# Patient Record
Sex: FEMALE | Race: WHITE | Employment: FULL TIME | URBAN - METROPOLITAN AREA
[De-identification: names, ages, dates, MRNs, and addresses within clinical notes are randomized per-mention and may not be internally consistent; named-entity substitution may affect disease eponyms.]

---

## 2019-06-26 ENCOUNTER — OFFICE VISIT (OUTPATIENT)
Dept: RHEUMATOLOGY | Age: 59
End: 2019-06-26
Payer: COMMERCIAL

## 2019-06-26 VITALS
SYSTOLIC BLOOD PRESSURE: 124 MMHG | BODY MASS INDEX: 36.88 KG/M2 | DIASTOLIC BLOOD PRESSURE: 86 MMHG | WEIGHT: 216 LBS | HEIGHT: 64 IN

## 2019-06-26 DIAGNOSIS — M11.861: ICD-10-CM

## 2019-06-26 DIAGNOSIS — M15.9 GENERALIZED OSTEOARTHRITIS: Primary | ICD-10-CM

## 2019-06-26 PROCEDURE — G8417 CALC BMI ABV UP PARAM F/U: HCPCS | Performed by: INTERNAL MEDICINE

## 2019-06-26 PROCEDURE — 3017F COLORECTAL CA SCREEN DOC REV: CPT | Performed by: INTERNAL MEDICINE

## 2019-06-26 PROCEDURE — G8427 DOCREV CUR MEDS BY ELIG CLIN: HCPCS | Performed by: INTERNAL MEDICINE

## 2019-06-26 PROCEDURE — 1036F TOBACCO NON-USER: CPT | Performed by: INTERNAL MEDICINE

## 2019-06-26 PROCEDURE — 99204 OFFICE O/P NEW MOD 45 MIN: CPT | Performed by: INTERNAL MEDICINE

## 2019-06-26 RX ORDER — VENLAFAXINE HYDROCHLORIDE 37.5 MG/1
1 CAPSULE, EXTENDED RELEASE ORAL DAILY
Refills: 3 | Status: ON HOLD | COMMUNITY
Start: 2019-06-23 | End: 2022-04-24

## 2019-06-26 RX ORDER — LEVOTHYROXINE SODIUM 0.12 MG/1
1 TABLET ORAL DAILY
Refills: 0 | COMMUNITY
Start: 2019-06-23

## 2019-06-26 RX ORDER — VENLAFAXINE HYDROCHLORIDE 75 MG/1
75 CAPSULE, EXTENDED RELEASE ORAL DAILY
Status: ON HOLD | COMMUNITY
Start: 2019-03-27 | End: 2022-04-24

## 2019-06-26 RX ORDER — LISINOPRIL 40 MG/1
1 TABLET ORAL DAILY
Refills: 2 | COMMUNITY
Start: 2019-05-14

## 2019-06-26 RX ORDER — HYDROCHLOROTHIAZIDE 25 MG/1
1 TABLET ORAL DAILY
Refills: 1 | COMMUNITY
Start: 2019-05-21

## 2019-06-26 RX ORDER — TRAZODONE HYDROCHLORIDE 50 MG/1
1 TABLET ORAL DAILY
Refills: 0 | COMMUNITY
Start: 2019-06-14

## 2019-06-26 NOTE — PATIENT INSTRUCTIONS
OSTEOARTHRITIS:                Osteoarthritis is a joint disease that most often affects middle-age to elderly people. It is commonly referred to as OA or as \"wear and tear\" of the joints, but we now know that OA is a disease of the entire joint, involving the cartilage, joint lining, ligaments, and bone. Although it is more common in older people, it is not really accurate to say that the joints are just \"wearing out. \"  About 27 million Americans are living with OA, the most common form of joint disease. The lifetime risk of developing OA of the knee is about 46%, and the lifetime risk of developing OA of the hip is 25%, according to the [facility], a long-term study from the [employer] and sponsored by [employer] for [employer] and [employer] original products (often called the [employer]) and the [employer]. OA is a top cause of disability in older people. The goal of treatment in OA is to reduce pain and improve function. There is no cure for the disease, but some treatments attempt to slow disease progression. Fast facts  OA is the most common form of joint disease, and is a leading cause of disability in elderly people. This arthritis tends to occur in the hand joints, spine, hips, knees, and great toes. It is characterized by breakdown of the cartilage (the tissue that cushions the ends of the bones between joints), bony changes of the joints, deterioration of tendons and ligaments, and various degrees of inflammation of the synovium (joint lining). Though some of the joint changes are irreversible, most patients will not need joint replacement surgery. OA symptoms (what you feel) can vary greatly among patients. A rheumatologist can detect arthritis and prescribe the proper treatment. In osteoarthritis, the cartilage between the bones in the joint breaks down (left image).  Slowly, affected bones get bigger, as in the hand at right.  What is osteoarthritis? OA is a frequently slowly progressive joint disease typically seen in middle-aged to elderly people. The disease occurs when the joint cartilage breaks down often because of mechanical stress or biochemical alterations, causing the bone underneath to fail. OA can occur together with other types of arthritis, such as gout or rheumatoid arthritis. OA tends to affect commonly used joints such as the hands and spine, and the weight-bearing joints such as the hips and knees. Symptoms include:   Joint pain and stiffness   Knobby swelling at the joint   Cracking or grinding noise with joint movement   Decreased function of the joint  Who gets osteoarthritis? OA affects people of all races and both sexes. Most often, it occurs in  patients age 36 and above. However, it can occur sooner if you have  other risk factors (things that raise the risk of getting OA). Risk factors include:   Older age   Having family members with OA   Obesity   Joint injury or repetitive use (overuse) of joints   Joint deformity such as unequal leg length, bowlegs or knocked knees  How is osteoarthritis diagnosed? Most often doctors detect OA based on the typical symptoms (described earlier) and on results of the physical exam. In some cases, X-rays or other imaging tests may be useful to tell the extent of disease or to help rule out other joint problems. Circles indicate joints that osteoarthritis most often affects. How is osteoarthritis treated? There is no proven treatment yet that can reverse joint damage from OA. The goal of treatment is to reduce pain and improve function of the affected joints. Most often, this is possible with a mixture of physical measures and drug therapy and, sometimes, surgery. Physical measures - Weight loss and exercise are useful in OA. Excess weight puts stress on your knee joints and hips and low back.  For every 10 pounds of weight you lose over 10 years, you can reduce the chance of developing knee OA by up to 50%. Exercise can improve your muscle strength, decrease joint pain and stiffness, and lower the chance of disability due to OA. Also helpful are support (\"assistive\") devices, such as braces or a walking cane, that help you do daily activities. Heat or cold therapy can help relieve OA symptoms for a short time. Certain alternative treatments such as spa (hot tub), massage, acupuncture and chiropractic manipulation can help relieve pain for a short time. They can be costly, though, and require repeated treatments. Also, the long-term benefits of these alternative (sometimes called complementary or integrative) medicine treatments are unproven but are under study. Drug Therapy - Forms of drug therapy include topical, oral (by mouth) and injections (shots). You apply topical drugs directly on the skin over the affected joints. These medicines include capsaicin cream, lidocaine and diclofenac gel. Oral pain relievers such as acetaminophen are common first treatments. So are nonsteroidal anti-inflammatory drugs (often called NSAIDs), which decrease swelling and pain. In 2010, the government (FDA) approved the use of duloxetine (Cymbalta) for chronic (long-term) musculoskeletal pain including from OA. This oral drug is not new. It also is in use for other health concerns, such as mood disorders, nerve pain and fibromyalgia. Patients with more serious pain may need stronger medications, such as prescription narcotics. Joint injections with corticosteroids (sometimes called cortisone shots) or with a form of lubricant called hyaluronic acid can give months of pain relief from OA. This lubricant is given in the knee, and these shots may help delay the need for a knee replacement by a few years in some patients. Surgery - Surgical treatment becomes an option for severe cases.  This includes when the joint has serious damage, or when medical treatment fails to relieve pain and you have major loss of function. Surgery may involve arthroscopy, repair of the joint done through small incisions (cuts). If the joint damage cannot be repaired, you may need a joint replacement. Supplements - Many over-the-counter nutrition supplements have been used for treatment of OA. Most lack good research data to support their effectiveness and safety. Among the most widely used are glucosamine/chondroitin sulfate, calcium and vitamin D, and omega-3 fatty acids. To ensure safety and avoid drug interactions, consult your doctor or pharmacist before using any of these supplements. This is especially true when you are combining these supplements with prescribed drugs. Living with Osteoarthritis  There is no cure for OA, but you can manage how it affects your lifestyle. Some tips include:  Properly position and support your neck and back while sitting or sleeping. Adjust furniture, such as raising a chair or toilet seat. Avoid repeated motions of the joint, especially frequent bending. Lose weight if you are overweight or obese, which can reduce pain and slow progression of OA. Exercise each day. Use arthritis support devices that will help you do daily activities. You might want to work with a physical therapist or occupational therapist to learn the best exercises and to choose arthritis assistive devices. Points to remember  OA is the most common form of arthritis and can occur together with other types of arthritis. The goal of treatment in OA is to reduce pain and improve function. Exercise is an important part of OA treatment because it can decrease joint pain and improve function. At present, there is no treatment that can reverse the damage of OA in the joints. Researchers are trying to find ways to slow or reverse this joint damage.    The rheumatologist's role in the treatment of osteoarthritis  Rheumatologists are doctors who are experts in diagnosing and treating arthritis and other diseases of the joints, muscles and bones. You may also need to see other health care providers, for instance, physical or occupational therapists and orthopedic doctors. To find a rheumatologist  For a list of rheumatologists in your area, click here. Learn more about rheumatologists and rheumatology health professionals. For more information  The [employer] of Rheumatology has compiled this list to give you a starting point for your own additional research. The ACR does not endorse or maintain these Web sites, and is not responsible for any information or claims provided on them. It is always best to talk with your rheumatologist for more information and before making any decisions about your care. Arthritis Foundation  ww.arthritis. [facility] of Arthritis and Musculoskeletal and Skin Diseases   www.[facility]. nih.gov  Rheumatology [address] [place] [address] advances research and training to improve the health of people with rheumatic diseases. © 2012 American College of Rheumatology      Patient Education        Thumb Arthritis: Exercises  Your Care Instructions  Here are some examples of exercises for thumb arthritis. Start each exercise slowly. Ease off the exercise if you start to have pain. Your doctor or your physical or occupational therapist will tell you when you can start these exercises and which ones will work best for you. How to do the exercises  Thumb IP flexion    1. Place your forearm and hand on a table with your affected thumb pointing up. 2. With your other hand, hold your thumb steady just below the joint nearest your thumbnail. 3. Bend the tip of your thumb downward, then straighten it. 4. Repeat 8 to 12 times. 5. Switch hands and repeat steps 1 through 4, even if only one thumb is sore. Thumb MP flexion    1.  Place your forearm and hand on a table with your affected thumb pointing up.  2. With your other hand, hold the base of your thumb and palm steady. 3. Bend your thumb downward where it meets your palm, then straighten it. 4. Repeat 8 to 12 times. 5. Switch hands and repeat steps 1 through 4, even if only one thumb is sore. Thumb opposition    1. With your affected hand, point your fingers and thumb straight up. Your wrist should be relaxed, following the line of your fingers and thumb. 2. Touch your affected thumb to each finger, one finger at a time. This will look like an \"okay\" sign, but try to keep your other fingers straight and pointing upward as much as you can. 3. Repeat 8 to 12 times. 4. Switch hands and repeat steps 1 through 3, even if only one thumb is sore. Follow-up care is a key part of your treatment and safety. Be sure to make and go to all appointments, and call your doctor if you are having problems. It's also a good idea to know your test results and keep a list of the medicines you take. Where can you learn more? Go to https://[employer]. org and sign in to your [employer] account. Enter V811 in the [employer] box to learn more about \"Thumb Arthritis: Exercises. \"     If you do not have an account, please click on the \"Sign Up Now\" link. Current as of: September 20, 2018  Content Version: 12.0  © 9286-4590 Healthwise, Incorporated. Care instructions adapted under license by [facility] ([facility]). If you have questions about a medical condition or this instruction, always ask your healthcare professional. [address] any warranty or liability for your use of this information.

## 2019-06-26 NOTE — PROGRESS NOTES
[address], [place]                                                           [address], [address] [facility]                                                             [phone] (x) [phone] (X)      Dear Dr. Roth primary care provider on file.:  Please find Rheumatology assessment. Thank you for giving me the opportunity to be involved in [facility] care and I look forward following Bennie Hendrickson along with you. If you have any questions or concerns please feel free to reach me. Note is transcribed using voice recognition software. Inadvertent computerized transcription errors may be present. Patient identification: Medardo Horn: 1960,58 y.o. Sex: female     A/P  Bennie Hendrickson was seen today for joint pain. 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 Clinically she also has OA of her CMC joint and scattered DIPs. Mild symptoms, manageable with occasional p.r.n. OTC Tylenol arthritis. Plan-  Osteoarthritis/ Joint pain : Discussed about diagnosis and management of osteoarthritis. There are no FDA approved disease modifying agents. Goal is to control pain and increase mobility. Discussed the importance of wt loss, exercises, formal PT, joint braces/splints. First line of agent Tylenol arthritis, NSAIDs systemic and/ or topical,Cymbalta, pain meds, joint injections- steroids, and visco supplementaiton for knee OA. Also discussed about surgical options. Discussed about weight management. Continue exercises. Okay to use over-the-counter Tylenol arthritis or ibuprofen in as-needed basis. CCPD R knee, no flares ,no secondary cause. Normal TSH, ca, LFTs. Risk of flare was explained. Follow-up p.r.n. Patient indicates understanding and agrees with the management plan. I reviewed patient's history, referral documents and electronic medical records. Copy of consult note is being routed electronically/faxed to referring physician. #######################################################################    HISTORY OF PRESENT ILLNESS:  66 y.o. female is referred here for evaluation of joint pain. She states that she has been having bilateral knee discomfort, and discomfort at the base of the thumb especially with repetitive use. Pain is mild intermittent, manageable with over-the-counter Tylenol arthritis once or twice a week. She has not noticed any swelling in her joints. There was one episode couple of months ago when her right knee was very stiff, achy, sore, and just could not walk, symptoms got better without any intervention. She made this appointment to learn more about arthritis-the kind see has, and what she can do to prevent the progression. She does not have daily symptoms. No swollen or inflamed joints. No psoriasis or inflammatory bowel diseases. Pertinent ROS: Denies weight loss, objective fever, skin rashes or skin thickening, photosensitivity Raynauds, focal alopecia, recurrent ocular congestion, dry eyes or mouth, or mucosal ulcers, tinnitus or recent hearing loss. Denies chest pain, palpitations, cough, pleurisy, dysphagia, or features of inflammatory bowel diseases. No h/o blood clots or bleeding disorders. No renal or genitourinary problems. No focal weakness or persistent paresthesia. All other ROS are negative. History reviewed. No pertinent past medical history.   Past Surgical History:   Procedure Laterality Date    ANTERIOR CRUCIATE LIGAMENT REPAIR Right     1992, 2 arthroscopic surgery prior the repair    HYSTERECTOMY, TOTAL ABDOMINAL      2011- MG tablet Take 1 tablet by mouth daily  0    Multiple Vitamin (MULTI-VITAMIN DAILY PO) Take 1 tablet by mouth daily      vitamin D (CHOLECALCIFEROL) 1000 UNIT TABS tablet Take 1,000 Units by mouth daily       No current facility-administered medications for this visit. No Known Allergies    PHYSICAL EXAM:    Vitals:    /86   Ht 5' 4\" (1.626 m)   Wt 216 lb (98 kg)   BMI 37.08 kg/m²   General appearance/ Psychiatric: well nourished, and well groomed, normal judgement, alert, appears stated age and cooperative. MKS: other than mild bony swelling of both CMC joints, left more than right, and bony crepitus of her knees, I do not appreciate any tender, swollen or inflamed joints in upper or lower extremities. Skin: No rashes, no induration or skin thickening or nodules. No evidence ischemia or deformities noted in digits or nails. HEENT: Normal lids, lacrimal glands and pupils. No oral or nasal ulcers. Salivary glands reveal no evidence of abnormality. External inspection of the ears and nose within normal limits. Neck: No masses or asymmetry. No thyroid enlargement. Chest: Normal effort, clear to auscultation. Heart:  Normal s1/s2, no leg edema. Neurologic: normal deep tendon reflexes. No foot or wrist drop. DATA:     X ray 2017  Result Impression   IMPRESSION:  1.  Tricompartmental right knee osteoarthrosis with moderate/severe joint space narrowing in the medial compartment and chondrocalcinosis. 2.  Postoperative changes of right ACL repair with heterotopic ossification and/or intra-articular fragments adjacent to the lateral femoral condyle.        No results found for: WBC, HGB, HCT, MCV, PLT      Chemistry    No results found for: NA, K, CL, CO2, BUN, CREATININE No results found for: CALCIUM, ALKPHOS, AST, ALT, BILITOT       No results found for: LABURIC  No results found for: SEDRATE  No results found for: CRP  No results found for: MARYSOL, RHEUMFACTOR, SEDRATE  No results found for: CKTOTAL  No results found for: TSH  No results found for: TBXK80      Radiology Review:        A/P- See above.

## 2022-04-18 ENCOUNTER — HOSPITAL ENCOUNTER (INPATIENT)
Age: 62
LOS: 6 days | Discharge: HOME | DRG: 853 | End: 2022-04-24
Attending: EMERGENCY MEDICINE | Admitting: SURGERY
Payer: COMMERCIAL

## 2022-04-18 ENCOUNTER — APPOINTMENT (OUTPATIENT)
Dept: CT IMAGING | Age: 62
DRG: 853 | End: 2022-04-18
Payer: COMMERCIAL

## 2022-04-18 ENCOUNTER — ANESTHESIA (OUTPATIENT)
Dept: OPERATING ROOM | Age: 62
DRG: 853 | End: 2022-04-18
Payer: COMMERCIAL

## 2022-04-18 ENCOUNTER — ANESTHESIA EVENT (OUTPATIENT)
Dept: OPERATING ROOM | Age: 62
DRG: 853 | End: 2022-04-18
Payer: COMMERCIAL

## 2022-04-18 VITALS
SYSTOLIC BLOOD PRESSURE: 126 MMHG | TEMPERATURE: 99.5 F | RESPIRATION RATE: 21 BRPM | OXYGEN SATURATION: 100 % | DIASTOLIC BLOOD PRESSURE: 62 MMHG

## 2022-04-18 DIAGNOSIS — K35.80 ACUTE APPENDICITIS, UNSPECIFIED ACUTE APPENDICITIS TYPE: Primary | ICD-10-CM

## 2022-04-18 PROBLEM — K35.32 ACUTE APPENDICITIS WITH RUPTURE: Status: ACTIVE | Noted: 2022-04-18

## 2022-04-18 LAB
ALBUMIN SERPL-MCNC: 4.9 G/DL (ref 3.4–5)
ALP BLD-CCNC: 199 U/L (ref 40–129)
ALT SERPL-CCNC: 44 U/L (ref 10–40)
ANION GAP SERPL CALCULATED.3IONS-SCNC: 16 MMOL/L (ref 3–16)
AST SERPL-CCNC: 26 U/L (ref 15–37)
BASOPHILS ABSOLUTE: 0.1 K/UL (ref 0–0.2)
BASOPHILS RELATIVE PERCENT: 0.8 %
BILIRUB SERPL-MCNC: 0.6 MG/DL (ref 0–1)
BILIRUBIN DIRECT: <0.2 MG/DL (ref 0–0.3)
BILIRUBIN URINE: NEGATIVE
BILIRUBIN, INDIRECT: ABNORMAL MG/DL (ref 0–1)
BLOOD, URINE: NEGATIVE
BUN BLDV-MCNC: 12 MG/DL (ref 7–20)
CALCIUM SERPL-MCNC: 10.6 MG/DL (ref 8.3–10.6)
CHLORIDE BLD-SCNC: 102 MMOL/L (ref 99–110)
CLARITY: CLEAR
CO2: 20 MMOL/L (ref 21–32)
COLOR: YELLOW
CREAT SERPL-MCNC: 0.8 MG/DL (ref 0.6–1.1)
EOSINOPHILS ABSOLUTE: 0 K/UL (ref 0–0.6)
EOSINOPHILS RELATIVE PERCENT: 0 %
EPITHELIAL CELLS, UA: ABNORMAL /HPF (ref 0–5)
GFR AFRICAN AMERICAN: >60
GFR NON-AFRICAN AMERICAN: >60
GLUCOSE BLD-MCNC: 149 MG/DL (ref 70–99)
GLUCOSE URINE: NEGATIVE MG/DL
HCT VFR BLD CALC: 39.5 % (ref 36–48)
HEMOGLOBIN: 13.6 G/DL (ref 12–16)
HYALINE CASTS: ABNORMAL /LPF (ref 0–2)
INR BLD: 1.15 (ref 0.88–1.12)
KETONES, URINE: NEGATIVE MG/DL
LEUKOCYTE ESTERASE, URINE: NEGATIVE
LIPASE: 23 U/L (ref 13–60)
LYMPHOCYTES ABSOLUTE: 1.6 K/UL (ref 1–5.1)
LYMPHOCYTES RELATIVE PERCENT: 11.1 %
MCH RBC QN AUTO: 29.1 PG (ref 26–34)
MCHC RBC AUTO-ENTMCNC: 34.5 G/DL (ref 31–36)
MCV RBC AUTO: 84.5 FL (ref 80–100)
MICROSCOPIC EXAMINATION: ABNORMAL
MONOCYTES ABSOLUTE: 0.5 K/UL (ref 0–1.3)
MONOCYTES RELATIVE PERCENT: 3.6 %
NEUTROPHILS ABSOLUTE: 12.3 K/UL (ref 1.7–7.7)
NEUTROPHILS RELATIVE PERCENT: 84.5 %
NITRITE, URINE: NEGATIVE
PDW BLD-RTO: 13.9 % (ref 12.4–15.4)
PH UA: 6 (ref 5–8)
PLATELET # BLD: 339 K/UL (ref 135–450)
PMV BLD AUTO: 7.8 FL (ref 5–10.5)
POTASSIUM REFLEX MAGNESIUM: 3.6 MMOL/L (ref 3.5–5.1)
PROTEIN UA: NEGATIVE MG/DL
PROTHROMBIN TIME: 13.1 SEC (ref 9.9–12.7)
RBC # BLD: 4.68 M/UL (ref 4–5.2)
RBC UA: ABNORMAL /HPF (ref 0–4)
SODIUM BLD-SCNC: 138 MMOL/L (ref 136–145)
SPECIFIC GRAVITY UA: 1.01 (ref 1–1.03)
TOTAL PROTEIN: 7.8 G/DL (ref 6.4–8.2)
URINE TYPE: ABNORMAL
UROBILINOGEN, URINE: 0.2 E.U./DL
WBC # BLD: 14.5 K/UL (ref 4–11)
WBC UA: ABNORMAL /HPF (ref 0–5)

## 2022-04-18 PROCEDURE — 74177 CT ABD & PELVIS W/CONTRAST: CPT

## 2022-04-18 PROCEDURE — 2580000003 HC RX 258: Performed by: SURGERY

## 2022-04-18 PROCEDURE — 6360000002 HC RX W HCPCS: Performed by: ANESTHESIOLOGY

## 2022-04-18 PROCEDURE — L0450 TLSO FLEX TRUNK/THOR PRE OTS: HCPCS | Performed by: SURGERY

## 2022-04-18 PROCEDURE — 3700000001 HC ADD 15 MINUTES (ANESTHESIA): Performed by: SURGERY

## 2022-04-18 PROCEDURE — 96372 THER/PROPH/DIAG INJ SC/IM: CPT

## 2022-04-18 PROCEDURE — 3600000014 HC SURGERY LEVEL 4 ADDTL 15MIN: Performed by: SURGERY

## 2022-04-18 PROCEDURE — A4217 STERILE WATER/SALINE, 500 ML: HCPCS | Performed by: SURGERY

## 2022-04-18 PROCEDURE — 99223 1ST HOSP IP/OBS HIGH 75: CPT | Performed by: SURGERY

## 2022-04-18 PROCEDURE — 81001 URINALYSIS AUTO W/SCOPE: CPT

## 2022-04-18 PROCEDURE — 80076 HEPATIC FUNCTION PANEL: CPT

## 2022-04-18 PROCEDURE — 44970 LAPAROSCOPY APPENDECTOMY: CPT | Performed by: SURGERY

## 2022-04-18 PROCEDURE — 3700000000 HC ANESTHESIA ATTENDED CARE: Performed by: SURGERY

## 2022-04-18 PROCEDURE — 96374 THER/PROPH/DIAG INJ IV PUSH: CPT

## 2022-04-18 PROCEDURE — 6370000000 HC RX 637 (ALT 250 FOR IP): Performed by: STUDENT IN AN ORGANIZED HEALTH CARE EDUCATION/TRAINING PROGRAM

## 2022-04-18 PROCEDURE — 2500000003 HC RX 250 WO HCPCS: Performed by: ANESTHESIOLOGY

## 2022-04-18 PROCEDURE — 88304 TISSUE EXAM BY PATHOLOGIST: CPT

## 2022-04-18 PROCEDURE — 6360000002 HC RX W HCPCS: Performed by: STUDENT IN AN ORGANIZED HEALTH CARE EDUCATION/TRAINING PROGRAM

## 2022-04-18 PROCEDURE — 6360000002 HC RX W HCPCS: Performed by: SURGERY

## 2022-04-18 PROCEDURE — 99285 EMERGENCY DEPT VISIT HI MDM: CPT

## 2022-04-18 PROCEDURE — 83690 ASSAY OF LIPASE: CPT

## 2022-04-18 PROCEDURE — 96376 TX/PRO/DX INJ SAME DRUG ADON: CPT

## 2022-04-18 PROCEDURE — 2500000003 HC RX 250 WO HCPCS: Performed by: SURGERY

## 2022-04-18 PROCEDURE — 96361 HYDRATE IV INFUSION ADD-ON: CPT

## 2022-04-18 PROCEDURE — 7100000001 HC PACU RECOVERY - ADDTL 15 MIN: Performed by: SURGERY

## 2022-04-18 PROCEDURE — 85610 PROTHROMBIN TIME: CPT

## 2022-04-18 PROCEDURE — 85025 COMPLETE CBC W/AUTO DIFF WBC: CPT

## 2022-04-18 PROCEDURE — 2709999900 HC NON-CHARGEABLE SUPPLY: Performed by: SURGERY

## 2022-04-18 PROCEDURE — 36415 COLL VENOUS BLD VENIPUNCTURE: CPT

## 2022-04-18 PROCEDURE — 2580000003 HC RX 258: Performed by: STUDENT IN AN ORGANIZED HEALTH CARE EDUCATION/TRAINING PROGRAM

## 2022-04-18 PROCEDURE — 6370000000 HC RX 637 (ALT 250 FOR IP): Performed by: ANESTHESIOLOGY

## 2022-04-18 PROCEDURE — 7100000000 HC PACU RECOVERY - FIRST 15 MIN: Performed by: SURGERY

## 2022-04-18 PROCEDURE — 2720000010 HC SURG SUPPLY STERILE: Performed by: SURGERY

## 2022-04-18 PROCEDURE — 80048 BASIC METABOLIC PNL TOTAL CA: CPT

## 2022-04-18 PROCEDURE — 3600000004 HC SURGERY LEVEL 4 BASE: Performed by: SURGERY

## 2022-04-18 PROCEDURE — 1200000000 HC SEMI PRIVATE

## 2022-04-18 PROCEDURE — 6360000002 HC RX W HCPCS

## 2022-04-18 PROCEDURE — 2580000003 HC RX 258: Performed by: ANESTHESIOLOGY

## 2022-04-18 PROCEDURE — 6360000004 HC RX CONTRAST MEDICATION: Performed by: STUDENT IN AN ORGANIZED HEALTH CARE EDUCATION/TRAINING PROGRAM

## 2022-04-18 PROCEDURE — 0DTJ4ZZ RESECTION OF APPENDIX, PERCUTANEOUS ENDOSCOPIC APPROACH: ICD-10-PCS | Performed by: SURGERY

## 2022-04-18 RX ORDER — ONDANSETRON 2 MG/ML
4 INJECTION INTRAMUSCULAR; INTRAVENOUS
Status: DISCONTINUED | OUTPATIENT
Start: 2022-04-18 | End: 2022-04-18 | Stop reason: HOSPADM

## 2022-04-18 RX ORDER — BUPIVACAINE HYDROCHLORIDE 5 MG/ML
INJECTION, SOLUTION EPIDURAL; INTRACAUDAL PRN
Status: DISCONTINUED | OUTPATIENT
Start: 2022-04-18 | End: 2022-04-18 | Stop reason: ALTCHOICE

## 2022-04-18 RX ORDER — ROCURONIUM BROMIDE 10 MG/ML
INJECTION, SOLUTION INTRAVENOUS PRN
Status: DISCONTINUED | OUTPATIENT
Start: 2022-04-18 | End: 2022-04-18 | Stop reason: SDUPTHER

## 2022-04-18 RX ORDER — HYDRALAZINE HYDROCHLORIDE 20 MG/ML
INJECTION INTRAMUSCULAR; INTRAVENOUS PRN
Status: DISCONTINUED | OUTPATIENT
Start: 2022-04-18 | End: 2022-04-18 | Stop reason: SDUPTHER

## 2022-04-18 RX ORDER — SODIUM CHLORIDE, SODIUM LACTATE, POTASSIUM CHLORIDE, AND CALCIUM CHLORIDE .6; .31; .03; .02 G/100ML; G/100ML; G/100ML; G/100ML
1000 INJECTION, SOLUTION INTRAVENOUS ONCE
Status: COMPLETED | OUTPATIENT
Start: 2022-04-18 | End: 2022-04-18

## 2022-04-18 RX ORDER — SODIUM CHLORIDE, SODIUM LACTATE, POTASSIUM CHLORIDE, CALCIUM CHLORIDE 600; 310; 30; 20 MG/100ML; MG/100ML; MG/100ML; MG/100ML
INJECTION, SOLUTION INTRAVENOUS CONTINUOUS
Status: DISCONTINUED | OUTPATIENT
Start: 2022-04-18 | End: 2022-04-24 | Stop reason: HOSPADM

## 2022-04-18 RX ORDER — SODIUM CHLORIDE 0.9 % (FLUSH) 0.9 %
5-40 SYRINGE (ML) INJECTION PRN
Status: DISCONTINUED | OUTPATIENT
Start: 2022-04-18 | End: 2022-04-24 | Stop reason: HOSPADM

## 2022-04-18 RX ORDER — SODIUM CHLORIDE 0.9 % (FLUSH) 0.9 %
5-40 SYRINGE (ML) INJECTION EVERY 12 HOURS SCHEDULED
Status: DISCONTINUED | OUTPATIENT
Start: 2022-04-18 | End: 2022-04-24 | Stop reason: HOSPADM

## 2022-04-18 RX ORDER — SODIUM CHLORIDE, SODIUM LACTATE, POTASSIUM CHLORIDE, AND CALCIUM CHLORIDE .6; .31; .03; .02 G/100ML; G/100ML; G/100ML; G/100ML
IRRIGANT IRRIGATION PRN
Status: DISCONTINUED | OUTPATIENT
Start: 2022-04-18 | End: 2022-04-18 | Stop reason: ALTCHOICE

## 2022-04-18 RX ORDER — MEPERIDINE HYDROCHLORIDE 25 MG/ML
12.5 INJECTION INTRAMUSCULAR; INTRAVENOUS; SUBCUTANEOUS EVERY 5 MIN PRN
Status: DISCONTINUED | OUTPATIENT
Start: 2022-04-18 | End: 2022-04-18 | Stop reason: HOSPADM

## 2022-04-18 RX ORDER — HYDRALAZINE HYDROCHLORIDE 20 MG/ML
10 INJECTION INTRAMUSCULAR; INTRAVENOUS
Status: DISCONTINUED | OUTPATIENT
Start: 2022-04-18 | End: 2022-04-18 | Stop reason: HOSPADM

## 2022-04-18 RX ORDER — FENTANYL CITRATE 50 UG/ML
INJECTION, SOLUTION INTRAMUSCULAR; INTRAVENOUS PRN
Status: DISCONTINUED | OUTPATIENT
Start: 2022-04-18 | End: 2022-04-18 | Stop reason: SDUPTHER

## 2022-04-18 RX ORDER — SODIUM CHLORIDE 0.9 % (FLUSH) 0.9 %
5-40 SYRINGE (ML) INJECTION PRN
Status: DISCONTINUED | OUTPATIENT
Start: 2022-04-18 | End: 2022-04-18 | Stop reason: HOSPADM

## 2022-04-18 RX ORDER — SODIUM CHLORIDE 9 MG/ML
INJECTION, SOLUTION INTRAVENOUS CONTINUOUS PRN
Status: DISCONTINUED | OUTPATIENT
Start: 2022-04-18 | End: 2022-04-18 | Stop reason: SDUPTHER

## 2022-04-18 RX ORDER — SUCCINYLCHOLINE CHLORIDE 20 MG/ML
INJECTION INTRAMUSCULAR; INTRAVENOUS PRN
Status: DISCONTINUED | OUTPATIENT
Start: 2022-04-18 | End: 2022-04-18 | Stop reason: SDUPTHER

## 2022-04-18 RX ORDER — SODIUM CHLORIDE 0.9 % (FLUSH) 0.9 %
5-40 SYRINGE (ML) INJECTION EVERY 12 HOURS SCHEDULED
Status: DISCONTINUED | OUTPATIENT
Start: 2022-04-18 | End: 2022-04-18 | Stop reason: HOSPADM

## 2022-04-18 RX ORDER — ONDANSETRON 4 MG/1
4 TABLET, ORALLY DISINTEGRATING ORAL EVERY 8 HOURS PRN
Status: DISCONTINUED | OUTPATIENT
Start: 2022-04-18 | End: 2022-04-24 | Stop reason: HOSPADM

## 2022-04-18 RX ORDER — LIDOCAINE HYDROCHLORIDE 20 MG/ML
INJECTION, SOLUTION EPIDURAL; INFILTRATION; INTRACAUDAL; PERINEURAL PRN
Status: DISCONTINUED | OUTPATIENT
Start: 2022-04-18 | End: 2022-04-18 | Stop reason: SDUPTHER

## 2022-04-18 RX ORDER — MAGNESIUM HYDROXIDE 1200 MG/15ML
LIQUID ORAL CONTINUOUS PRN
Status: COMPLETED | OUTPATIENT
Start: 2022-04-18 | End: 2022-04-18

## 2022-04-18 RX ORDER — DIPHENHYDRAMINE HYDROCHLORIDE 50 MG/ML
12.5 INJECTION INTRAMUSCULAR; INTRAVENOUS
Status: DISCONTINUED | OUTPATIENT
Start: 2022-04-18 | End: 2022-04-18 | Stop reason: HOSPADM

## 2022-04-18 RX ORDER — ACETAMINOPHEN 650 MG/1
650 SUPPOSITORY RECTAL EVERY 4 HOURS PRN
Status: DISCONTINUED | OUTPATIENT
Start: 2022-04-18 | End: 2022-04-18 | Stop reason: HOSPADM

## 2022-04-18 RX ORDER — PROPOFOL 10 MG/ML
INJECTION, EMULSION INTRAVENOUS PRN
Status: DISCONTINUED | OUTPATIENT
Start: 2022-04-18 | End: 2022-04-18 | Stop reason: SDUPTHER

## 2022-04-18 RX ORDER — SODIUM CHLORIDE 9 MG/ML
INJECTION, SOLUTION INTRAVENOUS PRN
Status: DISCONTINUED | OUTPATIENT
Start: 2022-04-18 | End: 2022-04-24 | Stop reason: HOSPADM

## 2022-04-18 RX ORDER — ONDANSETRON 4 MG/1
4 TABLET, ORALLY DISINTEGRATING ORAL ONCE
Status: COMPLETED | OUTPATIENT
Start: 2022-04-18 | End: 2022-04-18

## 2022-04-18 RX ORDER — ONDANSETRON 2 MG/ML
INJECTION INTRAMUSCULAR; INTRAVENOUS PRN
Status: DISCONTINUED | OUTPATIENT
Start: 2022-04-18 | End: 2022-04-18 | Stop reason: SDUPTHER

## 2022-04-18 RX ORDER — SODIUM CHLORIDE 9 MG/ML
INJECTION, SOLUTION INTRAVENOUS PRN
Status: DISCONTINUED | OUTPATIENT
Start: 2022-04-18 | End: 2022-04-18 | Stop reason: HOSPADM

## 2022-04-18 RX ORDER — ONDANSETRON 2 MG/ML
4 INJECTION INTRAMUSCULAR; INTRAVENOUS EVERY 6 HOURS PRN
Status: DISCONTINUED | OUTPATIENT
Start: 2022-04-18 | End: 2022-04-24 | Stop reason: HOSPADM

## 2022-04-18 RX ADMIN — HYDRALAZINE HYDROCHLORIDE 10 MG: 20 INJECTION INTRAMUSCULAR; INTRAVENOUS at 21:04

## 2022-04-18 RX ADMIN — ONDANSETRON 4 MG: 4 TABLET, ORALLY DISINTEGRATING ORAL at 16:52

## 2022-04-18 RX ADMIN — ONDANSETRON 4 MG: 2 INJECTION INTRAMUSCULAR; INTRAVENOUS at 20:57

## 2022-04-18 RX ADMIN — ROCURONIUM BROMIDE 30 MG: 10 INJECTION INTRAVENOUS at 20:39

## 2022-04-18 RX ADMIN — FENTANYL CITRATE 50 MCG: 50 INJECTION, SOLUTION INTRAMUSCULAR; INTRAVENOUS at 20:48

## 2022-04-18 RX ADMIN — SODIUM CHLORIDE, SODIUM LACTATE, POTASSIUM CHLORIDE, AND CALCIUM CHLORIDE 1000 ML: .6; .31; .03; .02 INJECTION, SOLUTION INTRAVENOUS at 16:53

## 2022-04-18 RX ADMIN — FAMOTIDINE 20 MG: 10 INJECTION, SOLUTION INTRAVENOUS at 20:44

## 2022-04-18 RX ADMIN — ACETAMINOPHEN 650 MG: 650 SUPPOSITORY RECTAL at 21:40

## 2022-04-18 RX ADMIN — FENTANYL CITRATE 50 MCG: 50 INJECTION, SOLUTION INTRAMUSCULAR; INTRAVENOUS at 21:00

## 2022-04-18 RX ADMIN — SUGAMMADEX 200 MG: 100 INJECTION, SOLUTION INTRAVENOUS at 21:18

## 2022-04-18 RX ADMIN — HYDROMORPHONE HYDROCHLORIDE 0.25 MG: 1 INJECTION, SOLUTION INTRAMUSCULAR; INTRAVENOUS; SUBCUTANEOUS at 22:07

## 2022-04-18 RX ADMIN — HYDROMORPHONE HYDROCHLORIDE 0.5 MG: 1 INJECTION, SOLUTION INTRAMUSCULAR; INTRAVENOUS; SUBCUTANEOUS at 21:51

## 2022-04-18 RX ADMIN — FENTANYL CITRATE 50 MCG: 50 INJECTION, SOLUTION INTRAMUSCULAR; INTRAVENOUS at 20:37

## 2022-04-18 RX ADMIN — HYDROMORPHONE HYDROCHLORIDE 0.25 MG: 1 INJECTION, SOLUTION INTRAMUSCULAR; INTRAVENOUS; SUBCUTANEOUS at 22:12

## 2022-04-18 RX ADMIN — PROPOFOL 160 MG: 10 INJECTION, EMULSION INTRAVENOUS at 20:31

## 2022-04-18 RX ADMIN — ROCURONIUM BROMIDE 10 MG: 10 INJECTION INTRAVENOUS at 20:31

## 2022-04-18 RX ADMIN — PIPERACILLIN AND TAZOBACTAM 4500 MG: 4; .5 INJECTION, POWDER, LYOPHILIZED, FOR SOLUTION INTRAVENOUS at 20:06

## 2022-04-18 RX ADMIN — IOPAMIDOL 80 ML: 755 INJECTION, SOLUTION INTRAVENOUS at 16:39

## 2022-04-18 RX ADMIN — SUCCINYLCHOLINE CHLORIDE 140 MG: 20 INJECTION, SOLUTION INTRAMUSCULAR; INTRAVENOUS; PARENTERAL at 20:31

## 2022-04-18 RX ADMIN — FENTANYL CITRATE 50 MCG: 50 INJECTION, SOLUTION INTRAMUSCULAR; INTRAVENOUS at 20:28

## 2022-04-18 RX ADMIN — ENOXAPARIN SODIUM 40 MG: 100 INJECTION SUBCUTANEOUS at 19:51

## 2022-04-18 RX ADMIN — HYDROMORPHONE HYDROCHLORIDE 1 MG: 1 INJECTION, SOLUTION INTRAMUSCULAR; INTRAVENOUS; SUBCUTANEOUS at 18:39

## 2022-04-18 RX ADMIN — HYDROMORPHONE HYDROCHLORIDE 0.5 MG: 1 INJECTION, SOLUTION INTRAMUSCULAR; INTRAVENOUS; SUBCUTANEOUS at 16:51

## 2022-04-18 RX ADMIN — SODIUM CHLORIDE: 9 INJECTION, SOLUTION INTRAVENOUS at 20:27

## 2022-04-18 RX ADMIN — LIDOCAINE HYDROCHLORIDE 60 MG: 20 INJECTION, SOLUTION EPIDURAL; INFILTRATION; INTRACAUDAL; PERINEURAL at 20:30

## 2022-04-18 ASSESSMENT — PAIN SCALES - GENERAL
PAINLEVEL_OUTOF10: 7
PAINLEVEL_OUTOF10: 7
PAINLEVEL_OUTOF10: 10
PAINLEVEL_OUTOF10: 0
PAINLEVEL_OUTOF10: 10
PAINLEVEL_OUTOF10: 10
PAINLEVEL_OUTOF10: 8
PAINLEVEL_OUTOF10: 6
PAINLEVEL_OUTOF10: 6

## 2022-04-18 ASSESSMENT — PULMONARY FUNCTION TESTS
PIF_VALUE: 1
PIF_VALUE: 26
PIF_VALUE: 40
PIF_VALUE: 28
PIF_VALUE: 41
PIF_VALUE: 38
PIF_VALUE: 29
PIF_VALUE: 6
PIF_VALUE: 7
PIF_VALUE: 35
PIF_VALUE: 27
PIF_VALUE: 27
PIF_VALUE: 1
PIF_VALUE: 1
PIF_VALUE: 35
PIF_VALUE: 1
PIF_VALUE: 31
PIF_VALUE: 38
PIF_VALUE: 35
PIF_VALUE: 1
PIF_VALUE: 27
PIF_VALUE: 3
PIF_VALUE: 35
PIF_VALUE: 36
PIF_VALUE: 36
PIF_VALUE: 30
PIF_VALUE: 36
PIF_VALUE: 41
PIF_VALUE: 36
PIF_VALUE: 5
PIF_VALUE: 28
PIF_VALUE: 27
PIF_VALUE: 27
PIF_VALUE: 41
PIF_VALUE: 28
PIF_VALUE: 36
PIF_VALUE: 34
PIF_VALUE: 1
PIF_VALUE: 28
PIF_VALUE: 8
PIF_VALUE: 30
PIF_VALUE: 27
PIF_VALUE: 29
PIF_VALUE: 38
PIF_VALUE: 27
PIF_VALUE: 4
PIF_VALUE: 27
PIF_VALUE: 26
PIF_VALUE: 28
PIF_VALUE: 40
PIF_VALUE: 26
PIF_VALUE: 35
PIF_VALUE: 35
PIF_VALUE: 39
PIF_VALUE: 27
PIF_VALUE: 29
PIF_VALUE: 28

## 2022-04-18 ASSESSMENT — PAIN DESCRIPTION - DIRECTION: RADIATING_TOWARDS: RIGHT SHOULDER

## 2022-04-18 ASSESSMENT — PAIN DESCRIPTION - ONSET: ONSET: GRADUAL

## 2022-04-18 ASSESSMENT — ENCOUNTER SYMPTOMS
VOMITING: 1
SHORTNESS OF BREATH: 0
COUGH: 0
DIARRHEA: 1
ABDOMINAL PAIN: 1
NAUSEA: 1
RHINORRHEA: 0
EYE PAIN: 0

## 2022-04-18 ASSESSMENT — PAIN DESCRIPTION - ORIENTATION
ORIENTATION: LOWER;RIGHT
ORIENTATION: LOWER;RIGHT

## 2022-04-18 ASSESSMENT — PAIN DESCRIPTION - PROGRESSION: CLINICAL_PROGRESSION: GRADUALLY WORSENING

## 2022-04-18 ASSESSMENT — PAIN DESCRIPTION - PAIN TYPE
TYPE: ACUTE PAIN
TYPE: ACUTE PAIN

## 2022-04-18 ASSESSMENT — PAIN DESCRIPTION - DESCRIPTORS
DESCRIPTORS: SHARP;CRAMPING
DESCRIPTORS: SHARP

## 2022-04-18 ASSESSMENT — PAIN - FUNCTIONAL ASSESSMENT
PAIN_FUNCTIONAL_ASSESSMENT: PREVENTS OR INTERFERES SOME ACTIVE ACTIVITIES AND ADLS
PAIN_FUNCTIONAL_ASSESSMENT: 0-10
PAIN_FUNCTIONAL_ASSESSMENT: 0-10

## 2022-04-18 ASSESSMENT — PAIN DESCRIPTION - LOCATION: LOCATION: ABDOMEN

## 2022-04-18 ASSESSMENT — LIFESTYLE VARIABLES: SMOKING_STATUS: 0

## 2022-04-18 ASSESSMENT — PAIN DESCRIPTION - FREQUENCY: FREQUENCY: CONTINUOUS

## 2022-04-18 NOTE — ED NOTES
Medication given per order. Pt made aware we need a urine. Call light in reach family at bedside.       Colby Guillen RN  04/18/22 [id number]

## 2022-04-18 NOTE — ED PROVIDER NOTES
[address] [facility] ENCOUNTER          PHYSICIAN ASSISTANT NOTE       Date of evaluation: 4/18/2022    Chief Complaint     Abdominal Pain (RLQ radiates up to shoulder)      History of Present Illness     Preeti Cano is a 63 y.o. female who presents with abdominal pain. Patient reports that yesterday she started experiencing right upper quadrant abdominal pain. She took Pepto-Bismol with no relief. The pain has persisted since then and today has radiated to her right lower quadrant with one episode of diarrhea and vomiting. She took Gas-X with no relief. She called her primary care physician who told her to come here to be evaluated. Has taken nothing else for the pain. She reports that since she has been in the ED, she is having pain on the tip of her right shoulder. Denies fever, chills, chest pain, shortness of breath, or urinary symptoms. Review of Systems     Review of Systems   Constitutional: Negative for chills and fever. HENT: Negative for congestion, ear pain and rhinorrhea. Eyes: Negative for pain. Respiratory: Negative for cough and shortness of breath. Cardiovascular: Negative for chest pain. Gastrointestinal: Positive for abdominal pain, diarrhea, nausea and vomiting. Genitourinary: Negative. Skin: Negative. Past Medical, Surgical, Family, and Social History     She has no past medical history on file. She has a past surgical history that includes Anterior cruciate ligament repair (Right) and Hysterectomy, total abdominal.  Her family history is not on file. She reports that she has never smoked. She has never used smokeless tobacco. She reports current alcohol use. She reports that she does not use drugs.     Medications     Previous Medications    HYDROCHLOROTHIAZIDE (HYDRODIURIL) 25 MG TABLET    Take 1 tablet by mouth daily    LEVOTHYROXINE (SYNTHROID) 125 MCG TABLET    Take 1 tablet by mouth daily    LISINOPRIL (PRINIVIL;ZESTRIL) 40 MG TABLET    Take 1 tablet by mouth daily    MULTIPLE VITAMIN (MULTI-VITAMIN DAILY PO)    Take 1 tablet by mouth daily    TRAZODONE (DESYREL) 50 MG TABLET    Take 1 tablet by mouth daily    VENLAFAXINE (EFFEXOR XR) 37.5 MG EXTENDED RELEASE CAPSULE    Take 1 capsule by mouth daily    VENLAFAXINE (EFFEXOR XR) 75 MG EXTENDED RELEASE CAPSULE    Take 75 mg by mouth daily    VITAMIN D (CHOLECALCIFEROL) 1000 UNIT TABS TABLET    Take 1,000 Units by mouth daily       Allergies     She has No Known Allergies. Physical Exam     INITIAL VITALS: BP: (!) 182/113, Temp: 98.4 °F (36.9 °C), Pulse: 126, Resp: 23, SpO2: 100 %  Physical Exam  Constitutional:       General: She is not in acute distress. Appearance: She is well-developed. She is not ill-appearing, toxic-appearing or diaphoretic. HENT:      Head: Normocephalic and atraumatic. Eyes:      General:         Right eye: No discharge. Left eye: No discharge. Cardiovascular:      Rate and Rhythm: Regular rhythm. Tachycardia present. Heart sounds: Normal heart sounds. No murmur heard. No friction rub. No gallop. Pulmonary:      Effort: Pulmonary effort is normal. No respiratory distress. Breath sounds: Normal breath sounds. No stridor. No wheezing, rhonchi or rales. Abdominal:      General: Abdomen is flat. There is no distension. Palpations: Abdomen is soft. There is no mass. Tenderness: There is abdominal tenderness. There is no guarding or rebound. Hernia: No hernia is present. Comments: Positive for McBurney point tenderness and Rovsing sign. Skin:     General: Skin is warm and dry. Findings: No bruising, erythema, lesion or rash. Neurological:      General: No focal deficit present. Mental Status: She is alert and oriented to person, place, and time.    Psychiatric:         Mood and Affect: Mood normal.         Behavior: Behavior normal.        Diagnostic Results     EKG   None    RADIOLOGY:  CT ABDOMEN PELVIS W IV CONTRAST Additional Contrast? None   Final Result   1. Dilated appendix with periappendiceal inflammatory change representing acute, uncomplicated appendicitis. 2. Lobulated cystic structure of the pelvis which may emanate from the left ovary. Follow-up pelvic ultrasound is recommended.              LABS:   Results for orders placed or performed during the hospital encounter of 04/18/22   CBC with Auto Differential   Result Value Ref Range    WBC 14.5 (H) 4.0 - 11.0 K/uL    RBC 4.68 4.00 - 5.20 M/uL    Hemoglobin 13.6 12.0 - 16.0 g/dL    Hematocrit 39.5 36.0 - 48.0 %    MCV 84.5 80.0 - 100.0 fL    MCH 29.1 26.0 - 34.0 pg    MCHC 34.5 31.0 - 36.0 g/dL    RDW 13.9 12.4 - 15.4 %    Platelets 003 773 - 055 K/uL    MPV 7.8 5.0 - 10.5 fL    Neutrophils % 84.5 %    Lymphocytes % 11.1 %    Monocytes % 3.6 %    Eosinophils % 0.0 %    Basophils % 0.8 %    Neutrophils Absolute 12.3 (H) 1.7 - 7.7 K/uL    Lymphocytes Absolute 1.6 1.0 - 5.1 K/uL    Monocytes Absolute 0.5 0.0 - 1.3 K/uL    Eosinophils Absolute 0.0 0.0 - 0.6 K/uL    Basophils Absolute 0.1 0.0 - 0.2 K/uL   Basic Metabolic Panel w/ Reflex to MG   Result Value Ref Range    Sodium 138 136 - 145 mmol/L    Potassium reflex Magnesium 3.6 3.5 - 5.1 mmol/L    Chloride 102 99 - 110 mmol/L    CO2 20 (L) 21 - 32 mmol/L    Anion Gap 16 3 - 16    Glucose 149 (H) 70 - 99 mg/dL    BUN 12 7 - 20 mg/dL    CREATININE 0.8 0.6 - 1.1 mg/dL    GFR Non-African American >60 >60    GFR African American >60 >60    Calcium 10.6 8.3 - 10.6 mg/dL   Lipase   Result Value Ref Range    Lipase 23.0 13.0 - 60.0 U/L   Hepatic Function Panel   Result Value Ref Range    Total Protein 7.8 6.4 - 8.2 g/dL    Albumin 4.9 3.4 - 5.0 g/dL    Alkaline Phosphatase 199 (H) 40 - 129 U/L    ALT 44 (H) 10 - 40 U/L    AST 26 15 - 37 U/L    Total Bilirubin 0.6 0.0 - 1.0 mg/dL    Bilirubin, Direct <0.2 0.0 - 0.3 mg/dL    Bilirubin, Indirect see below 0.0 - 1.0 mg/dL       ED BEDSIDE ULTRASOUND:  None    RECENT VITALS:  BP: (!) 164/103, Temp: 98.4 °F (36.9 °C), Pulse: 103, Resp: 23, SpO2: 100 %     Procedures     Procedures  None    ED Course     Nursing Notes, Past Medical Hx,Past Surgical Hx, Social Hx, Allergies, and Family Hx were reviewed. The patient was given the following medications:  Orders Placed This Encounter   Medications    ondansetron (ZOFRAN-ODT) disintegrating tablet 4 mg    HYDROmorphone (DILAUDID) injection 0.5 mg    lactated ringers bolus    iopamidol (ISOVUE-370) 76 % injection 80 mL       CONSULTS:  C/ Oren Padilla 19 / ASSESSMENT / Sultana Antonella is a 63 y.o. female with history of hypothyroidism, anxiety, depression, colon polyps (2/25/2014), HTN, hypercholesterolemia, obesity, and hyperglycemia who presents with abdominal pain that started in her RUQ yesterday and ahs migrated to her RLQ today with 1 episode of emesis and vomiting. On exam, patient has McBurney point tenderness and positive Rovsing sign. Patient received Dilaudid for the pain. Patient was given 1 L LR IVF for tachycardia. Lipase within normal limits and no electrolyte abnormalities. Leukocytosis. Based on the history, physical exam findings, and leukocytosis, CT abdomen and pelvis with IV contrast was completed and showed:  1. Dilated appendix with periappendiceal inflammatory change representing acute, uncomplicated appendicitis. 2. Lobulated cystic structure of the pelvis which may emanate from the left ovary. Follow-up pelvic ultrasound is recommended. Hepatic function panel and urinalysis pending. At this time, I do believe patient's symptoms are due to appendicitis. At this time I am going off service and will be signing out care of the patient to my colleague Devyn Kim for further care. Hepatic function panel, urinalysis, and surgical consultation is/are still pending.  Oncoming provider responsibilities include following up on pending labs/tests, reassessing patient, and appropriate disposition. Please see medical record for further management and medical decision making. The patient was evaluated by myself and the ED Attending Physician, Dr. Joaquin Catalan. All management and disposition plans were discussed and agreed upon. This note was dictated using voice-recognition software, which occasionally leads to inadvertent typographic errors. Clinical Impression     1. Acute appendicitis, unspecified acute appendicitis type    2.  Right lower quadrant abdominal pain      Disposition     DISPOSITION     Pending     Renato Bejarano  04/18/22 1089

## 2022-04-18 NOTE — H&P
General Surgery   Resident Consult Note    Reason for Consult: acute appendicitis    History of Present Illness:   Gregg Dorsey is a 63 y.o. female with Hx as delineated below presents with epigastric and periumbilical pain that now worse in the RLQ. Associated symptoms include nausea and vomiting. Denies fever, dyspnea or dysuria. Denies previous symptoms. Pt stated her last colonoscopy was 3 years ago with benign polyp. Takes alevel 3-4 times a week for arthritis. Denies AC or AP. Past Medical History:    HTN, HLD, hypothyroidism, arthritis, anxiety, GERD    Past Surgical History:           Procedure Laterality Date    ANTERIOR CRUCIATE LIGAMENT REPAIR Right     1992, 2 arthroscopic surgery prior the repair    HYSTERECTOMY, TOTAL ABDOMINAL      2011- fibroid uterus       Allergies:  Patient has no known allergies. Medications:   Home Meds  No current facility-administered medications on file prior to encounter. Current Outpatient Medications on File Prior to Encounter   Medication Sig Dispense Refill    hydrochlorothiazide (HYDRODIURIL) 25 MG tablet Take 1 tablet by mouth daily  1    levothyroxine (SYNTHROID) 125 MCG tablet Take 1 tablet by mouth daily  0    lisinopril (PRINIVIL;ZESTRIL) 40 MG tablet Take 1 tablet by mouth daily  2    venlafaxine (EFFEXOR XR) 75 MG extended release capsule Take 75 mg by mouth daily      venlafaxine (EFFEXOR XR) 37.5 MG extended release capsule Take 1 capsule by mouth daily  3    traZODone (DESYREL) 50 MG tablet Take 1 tablet by mouth daily  0    Multiple Vitamin (MULTI-VITAMIN DAILY PO) Take 1 tablet by mouth daily      vitamin D (CHOLECALCIFEROL) 1000 UNIT TABS tablet Take 1,000 Units by mouth daily         Current Meds  piperacillin-tazobactam (ZOSYN) 3,375 mg in dextrose 5 % 50 mL IVPB extended infusion (mini-bag), Q8H        Family History:   No family history on file. Social History:   TOBACCO:   reports that she has never smoked.  She has never used smokeless tobacco.  ETOH:   reports current alcohol use. DRUGS:   reports no history of drug use. ROS: A 14 point review of systems was conducted, significant findings as noted in HPI. All other systems negative. Physical exam:    Vitals:    04/18/22 1524 04/18/22 1647 04/18/22 1730 04/18/22 1745   BP: (!) 182/113 (!) 164/103 (!) 184/94 (!) 148/89   Pulse: 126 103 103 107   Resp:  23 (!) 31 26   Temp: 98.4 °F (36.9 °C)      TempSrc: Oral      SpO2:  100% 94% 97%       General appearance: alert, no acute distress, grooming appropriate  Eyes: PERRLA, no scleral icterus  Neck: trachea midline, no JVD, no lymphadenopathy  Chest/Lungs:, normal effort  Cardiovascular: mild tachycardia, regular  Abdomen: soft, RLQ moderately-tender, mild tenderness in the LLQ, non-distended, +BS, no guarding/rigidity  : deferred   Skin: warm and dry, no rashes  Extremities: no edema, no cyanosis  Neuro: A&Ox3, no focal deficits, sensation intact    Labs:    CBC:   Recent Labs     04/18/22  1539   WBC 14.5*   HGB 13.6   HCT 39.5   MCV 84.5        BMP:   Recent Labs     04/18/22  1539      K 3.6      CO2 20*   BUN 12   CREATININE 0.8     PT/INR: No results for input(s): PROTIME, INR in the last 72 hours. APTT: No results for input(s): APTT in the last 72 hours. Liver Profile:  Lab Results   Component Value Date    AST 26 04/18/2022    ALT 44 04/18/2022    BILIDIR <0.2 04/18/2022    BILITOT 0.6 04/18/2022    ALKPHOS 199 04/18/2022   No results found for: CHOL, HDL, TRIG  UA: No results found for: NITRITE, COLORU, PHUR, LABCAST, WBCUA, RBCUA, MUCUS, TRICHOMONAS, YEAST, BACTERIA, CLARITYU, SPECGRAV, LEUKOCYTESUR, UROBILINOGEN, BILIRUBINUR, BLOODU, GLUCOSEU, AMORPHOUS    Imaging:   CT ABDOMEN PELVIS W IV CONTRAST Additional Contrast? None   Final Result   1. Dilated appendix with periappendiceal inflammatory change representing acute, uncomplicated appendicitis.    2. Lobulated cystic structure of the pelvis which may emanate from the left ovary. Follow-up pelvic ultrasound is recommended. Assessment/Plan: This is a 63 y.o. female with abdominal pain and CT finding consistent with appendicitis. Will plan for surgery tonight.   - Schedule for lap appendectomy possible open  - Diet/IVF: NPO,  ml/hr  - PT/INR  - Antibiotic: zosyn  - Pregnancy test is not indicated  - Consent was obtained. Risks, benefits, and alternatives were discussed at length with the patient and family. All questions and concerns were answered. They agree to above procedure. - Anesthesia to see patient.       Discussed with staff, pt and ED staff      Tima Aponte MD  General Surgery Resident  04/18/22  6:25 PM  [phone]

## 2022-04-18 NOTE — ED PROVIDER NOTES
[address] [facility] ENCOUNTER          PHYSICIAN ASSISTANT NOTE     Date of evaluation: 4/18/2022    ADDENDUM:      Care of this patient was assumed from [place], [place]  The patient was seen for Abdominal Pain (RLQ radiates up to shoulder)  The patient's initial evaluation and plan have been discussed with the prior provider who initially evaluated the patient. Nursing Notes, Past Medical Hx, Past Surgical Hx, Social Hx, Allergies, and Family Hx were all reviewed. Diagnostic Results       RADIOLOGY:  CT ABDOMEN PELVIS W IV CONTRAST Additional Contrast? None   Final Result   1. Dilated appendix with periappendiceal inflammatory change representing acute, uncomplicated appendicitis. 2. Lobulated cystic structure of the pelvis which may emanate from the left ovary. Follow-up pelvic ultrasound is recommended.              LABS:   Results for orders placed or performed during the hospital encounter of 04/18/22   CBC with Auto Differential   Result Value Ref Range    WBC 14.5 (H) 4.0 - 11.0 K/uL    RBC 4.68 4.00 - 5.20 M/uL    Hemoglobin 13.6 12.0 - 16.0 g/dL    Hematocrit 39.5 36.0 - 48.0 %    MCV 84.5 80.0 - 100.0 fL    MCH 29.1 26.0 - 34.0 pg    MCHC 34.5 31.0 - 36.0 g/dL    RDW 13.9 12.4 - 15.4 %    Platelets 003 550 - 185 K/uL    MPV 7.8 5.0 - 10.5 fL    Neutrophils % 84.5 %    Lymphocytes % 11.1 %    Monocytes % 3.6 %    Eosinophils % 0.0 %    Basophils % 0.8 %    Neutrophils Absolute 12.3 (H) 1.7 - 7.7 K/uL    Lymphocytes Absolute 1.6 1.0 - 5.1 K/uL    Monocytes Absolute 0.5 0.0 - 1.3 K/uL    Eosinophils Absolute 0.0 0.0 - 0.6 K/uL    Basophils Absolute 0.1 0.0 - 0.2 K/uL   Basic Metabolic Panel w/ Reflex to MG   Result Value Ref Range    Sodium 138 136 - 145 mmol/L    Potassium reflex Magnesium 3.6 3.5 - 5.1 mmol/L    Chloride 102 99 - 110 mmol/L    CO2 20 (L) 21 - 32 mmol/L    Anion Gap 16 3 - 16    Glucose 149 (H) 70 - 99 mg/dL    BUN 12 7 - 20 mg/dL    CREATININE 0.8 0.6 - 1.1 mg/dL    GFR Non-African American >60 >60    GFR African American >60 >60    Calcium 10.6 8.3 - 10.6 mg/dL   Lipase   Result Value Ref Range    Lipase 23.0 13.0 - 60.0 U/L   Urinalysis with Microscopic   Result Value Ref Range    Color, UA Yellow Straw/Yellow    Clarity, UA Clear Clear    Glucose, Ur Negative Negative mg/dL    Bilirubin Urine Negative Negative    Ketones, Urine Negative Negative mg/dL    Specific Gravity, UA 1.010 1.005 - 1.030    Blood, Urine Negative Negative    pH, UA 6.0 5.0 - 8.0    Protein, UA Negative Negative mg/dL    Urobilinogen, Urine 0.2 <2.0 E.U./dL    Nitrite, Urine Negative Negative    Leukocyte Esterase, Urine Negative Negative    Microscopic Examination Not Indicated     Urine Type Voided     Hyaline Casts, UA 0-2 0 - 2 /LPF    WBC, UA 3-5 0 - 5 /HPF    RBC, UA None seen 0 - 4 /HPF    Epithelial Cells, UA  (A) 0 - 5 /HPF   Hepatic Function Panel   Result Value Ref Range    Total Protein 7.8 6.4 - 8.2 g/dL    Albumin 4.9 3.4 - 5.0 g/dL    Alkaline Phosphatase 199 (H) 40 - 129 U/L    ALT 44 (H) 10 - 40 U/L    AST 26 15 - 37 U/L    Total Bilirubin 0.6 0.0 - 1.0 mg/dL    Bilirubin, Direct <0.2 0.0 - 0.3 mg/dL    Bilirubin, Indirect see below 0.0 - 1.0 mg/dL   Protime-INR   Result Value Ref Range    Protime 13.1 (H) 9.9 - 12.7 sec    INR 1.15 (H) 0.88 - 1.12       RECENT VITALS:  BP: (!) 165/96, Temp: 98.4 °F (36.9 °C), Pulse: 126, Resp: 25, SpO2: 95 %     Procedures     none    ED Course     The patient was given the following medications:  Orders Placed This Encounter   Medications    ondansetron (ZOFRAN-ODT) disintegrating tablet 4 mg    HYDROmorphone (DILAUDID) injection 0.5 mg    lactated ringers bolus    iopamidol (ISOVUE-370) 76 % injection 80 mL    DISCONTD: piperacillin-tazobactam (ZOSYN) 3,375 mg in dextrose 5 % 50 mL IVPB extended infusion (mini-bag)     Order Specific Question:   Antimicrobial Indications     Answer:   Intra-Abdominal Infection    HYDROmorphone (DILAUDID) injection 1 mg    enoxaparin (LOVENOX) injection 40 mg    FOLLOWED BY Linked Order Group     piperacillin-tazobactam (ZOSYN) 4,500 mg in dextrose 5 % 100 mL IVPB (mini-bag)      Order Specific Question:   Antimicrobial Indications      Answer:   Intra-Abdominal Infection     piperacillin-tazobactam (ZOSYN) 3,375 mg in dextrose 5 % 50 mL IVPB (mini-bag)      Order Specific Question:   Antimicrobial Indications      Answer:   Intra-Abdominal Infection    lactated ringers infusion       CONSULTS:  SLOAN/ Oren Padilla 19 / ASSESSMENT / Corrine Dominique is a 63 y.o. female that presents with RLQ abdominal pain that started yesterday. The pain initially started on her right upper quadrant and eventually transitioned into her right lower quadrant and has been progressively worsening. The pain is also associated with nausea, diarrhea. She has had no fever or chills. She does still have her appendix. On physical examination by the off going provider, she was reportedly tachycardic but otherwise vitals were unremarkable. She did have McBurney point tenderness as well as Rovsing sign. She had no rebound or guarding. To further evaluate the patient's abdominal pain, the outgoing provider obtained laboratory work-up that was significant for leukocytosis to 14.5, ALT 44, Alk phos 199. CT A/p shows uncomplicated appendicitis. Therefore, general surgery was consulted. Their plan is to take her to the OR for appendectomy tonight. She will remain n.p.o. She was started on Zosyn. The patient will remain in the emergency department until she is taken to the OR. The patient was in agreement to the plan. This patient was also evaluated by the attending physician. All care plans were discussed and agreed upon. Clinical Impression     1.  Acute appendicitis, unspecified acute appendicitis type        Disposition     PATIENT REFERRED TO:  No follow-up provider specified.     DISCHARGE MEDICATIONS:  New Prescriptions    No medications on file       DISPOSITION Decision To Admit 04/18/2022 06:01:03 PM        MONICA Sanchez  04/18/22 2005

## 2022-04-18 NOTE — ED PROVIDER NOTES
ED Attending Attestation Note     Date of evaluation: 4/18/2022    This patient was seen by the advance practice provider. I have seen and examined the patient, agree with the workup, evaluation, management and diagnosis. The care plan has been discussed. My assessment reveals a very well-appearing patient, pleasantly conversational, in no acute distress. She presents with generalized abdominal pain that she initially felt was potentially gas, which has worsened and focalized in the right lower quadrant. She has focal right lower quadrant tenderness to palpation. Given her leukocytosis, CT was performed, which confirms acute uncomplicated appendicitis. The patient is being admitted for operative management by the general surgery service.          Crys Pardo MD  04/18/22 1976

## 2022-04-18 NOTE — ED NOTES
Pt ambulated to restroom with a steady gait urine collected and sent. Call light in reach.       Katie Aguilar RN  04/18/22 7085

## 2022-04-19 LAB
ALBUMIN SERPL-MCNC: 3.6 G/DL (ref 3.4–5)
ANION GAP SERPL CALCULATED.3IONS-SCNC: 7 MMOL/L (ref 3–16)
BASOPHILS ABSOLUTE: 0 K/UL (ref 0–0.2)
BASOPHILS RELATIVE PERCENT: 0.3 %
BUN BLDV-MCNC: 10 MG/DL (ref 7–20)
CALCIUM SERPL-MCNC: 9.6 MG/DL (ref 8.3–10.6)
CHLORIDE BLD-SCNC: 103 MMOL/L (ref 99–110)
CO2: 28 MMOL/L (ref 21–32)
CREAT SERPL-MCNC: 0.9 MG/DL (ref 0.6–1.1)
EOSINOPHILS ABSOLUTE: 0 K/UL (ref 0–0.6)
EOSINOPHILS RELATIVE PERCENT: 0 %
GFR AFRICAN AMERICAN: >60
GFR NON-AFRICAN AMERICAN: >60
GLUCOSE BLD-MCNC: 121 MG/DL (ref 70–99)
HCT VFR BLD CALC: 34 % (ref 36–48)
HEMOGLOBIN: 11.6 G/DL (ref 12–16)
LYMPHOCYTES ABSOLUTE: 1.7 K/UL (ref 1–5.1)
LYMPHOCYTES RELATIVE PERCENT: 13.4 %
MAGNESIUM: 1.8 MG/DL (ref 1.8–2.4)
MCH RBC QN AUTO: 29.5 PG (ref 26–34)
MCHC RBC AUTO-ENTMCNC: 34.2 G/DL (ref 31–36)
MCV RBC AUTO: 86.2 FL (ref 80–100)
MONOCYTES ABSOLUTE: 0.4 K/UL (ref 0–1.3)
MONOCYTES RELATIVE PERCENT: 3.3 %
NEUTROPHILS ABSOLUTE: 10.8 K/UL (ref 1.7–7.7)
NEUTROPHILS RELATIVE PERCENT: 83 %
PDW BLD-RTO: 13.5 % (ref 12.4–15.4)
PHOSPHORUS: 3.8 MG/DL (ref 2.5–4.9)
PLATELET # BLD: 254 K/UL (ref 135–450)
PMV BLD AUTO: 7.8 FL (ref 5–10.5)
POTASSIUM SERPL-SCNC: 3.8 MMOL/L (ref 3.5–5.1)
RBC # BLD: 3.94 M/UL (ref 4–5.2)
SODIUM BLD-SCNC: 138 MMOL/L (ref 136–145)
WBC # BLD: 13 K/UL (ref 4–11)

## 2022-04-19 PROCEDURE — 80069 RENAL FUNCTION PANEL: CPT

## 2022-04-19 PROCEDURE — 85025 COMPLETE CBC W/AUTO DIFF WBC: CPT

## 2022-04-19 PROCEDURE — 83735 ASSAY OF MAGNESIUM: CPT

## 2022-04-19 PROCEDURE — 6360000002 HC RX W HCPCS: Performed by: STUDENT IN AN ORGANIZED HEALTH CARE EDUCATION/TRAINING PROGRAM

## 2022-04-19 PROCEDURE — 6370000000 HC RX 637 (ALT 250 FOR IP): Performed by: STUDENT IN AN ORGANIZED HEALTH CARE EDUCATION/TRAINING PROGRAM

## 2022-04-19 PROCEDURE — 2580000003 HC RX 258: Performed by: STUDENT IN AN ORGANIZED HEALTH CARE EDUCATION/TRAINING PROGRAM

## 2022-04-19 PROCEDURE — 36415 COLL VENOUS BLD VENIPUNCTURE: CPT

## 2022-04-19 PROCEDURE — 1200000000 HC SEMI PRIVATE

## 2022-04-19 RX ORDER — SODIUM CHLORIDE, SODIUM LACTATE, POTASSIUM CHLORIDE, AND CALCIUM CHLORIDE .6; .31; .03; .02 G/100ML; G/100ML; G/100ML; G/100ML
1000 INJECTION, SOLUTION INTRAVENOUS ONCE
Status: COMPLETED | OUTPATIENT
Start: 2022-04-19 | End: 2022-04-19

## 2022-04-19 RX ORDER — ACETAMINOPHEN 500 MG
1000 TABLET ORAL EVERY 6 HOURS
Status: DISCONTINUED | OUTPATIENT
Start: 2022-04-19 | End: 2022-04-22

## 2022-04-19 RX ORDER — POTASSIUM CHLORIDE 7.45 MG/ML
10 INJECTION INTRAVENOUS
Status: COMPLETED | OUTPATIENT
Start: 2022-04-19 | End: 2022-04-19

## 2022-04-19 RX ORDER — MAGNESIUM SULFATE IN WATER 40 MG/ML
2000 INJECTION, SOLUTION INTRAVENOUS ONCE
Status: COMPLETED | OUTPATIENT
Start: 2022-04-19 | End: 2022-04-19

## 2022-04-19 RX ADMIN — POTASSIUM CHLORIDE 10 MEQ: 2 INJECTION, SOLUTION, CONCENTRATE INTRAVENOUS at 09:21

## 2022-04-19 RX ADMIN — METHOCARBAMOL 500 MG: 100 INJECTION, SOLUTION INTRAMUSCULAR; INTRAVENOUS at 08:31

## 2022-04-19 RX ADMIN — ACETAMINOPHEN 1000 MG: 500 TABLET ORAL at 07:21

## 2022-04-19 RX ADMIN — HYDROMORPHONE HYDROCHLORIDE 1 MG: 1 INJECTION, SOLUTION INTRAMUSCULAR; INTRAVENOUS; SUBCUTANEOUS at 19:51

## 2022-04-19 RX ADMIN — ACETAMINOPHEN 1000 MG: 500 TABLET ORAL at 18:16

## 2022-04-19 RX ADMIN — HYDROMORPHONE HYDROCHLORIDE 0.5 MG: 1 INJECTION, SOLUTION INTRAMUSCULAR; INTRAVENOUS; SUBCUTANEOUS at 03:11

## 2022-04-19 RX ADMIN — HYDROMORPHONE HYDROCHLORIDE 0.5 MG: 1 INJECTION, SOLUTION INTRAMUSCULAR; INTRAVENOUS; SUBCUTANEOUS at 05:27

## 2022-04-19 RX ADMIN — HYDROMORPHONE HYDROCHLORIDE 1 MG: 1 INJECTION, SOLUTION INTRAMUSCULAR; INTRAVENOUS; SUBCUTANEOUS at 16:05

## 2022-04-19 RX ADMIN — ACETAMINOPHEN 1000 MG: 500 TABLET ORAL at 12:51

## 2022-04-19 RX ADMIN — METHOCARBAMOL 500 MG: 100 INJECTION, SOLUTION INTRAMUSCULAR; INTRAVENOUS at 15:30

## 2022-04-19 RX ADMIN — PIPERACILLIN AND TAZOBACTAM 3375 MG: 3; .375 INJECTION, POWDER, LYOPHILIZED, FOR SOLUTION INTRAVENOUS at 12:09

## 2022-04-19 RX ADMIN — SODIUM CHLORIDE, POTASSIUM CHLORIDE, SODIUM LACTATE AND CALCIUM CHLORIDE: 600; 310; 30; 20 INJECTION, SOLUTION INTRAVENOUS at 15:30

## 2022-04-19 RX ADMIN — SODIUM CHLORIDE, POTASSIUM CHLORIDE, SODIUM LACTATE AND CALCIUM CHLORIDE 1000 ML: 600; 310; 30; 20 INJECTION, SOLUTION INTRAVENOUS at 07:24

## 2022-04-19 RX ADMIN — METHOCARBAMOL 500 MG: 100 INJECTION, SOLUTION INTRAMUSCULAR; INTRAVENOUS at 23:08

## 2022-04-19 RX ADMIN — POTASSIUM CHLORIDE 10 MEQ: 2 INJECTION, SOLUTION, CONCENTRATE INTRAVENOUS at 10:21

## 2022-04-19 RX ADMIN — PIPERACILLIN AND TAZOBACTAM 3375 MG: 3; .375 INJECTION, POWDER, LYOPHILIZED, FOR SOLUTION INTRAVENOUS at 18:16

## 2022-04-19 RX ADMIN — HYDROMORPHONE HYDROCHLORIDE 1 MG: 1 INJECTION, SOLUTION INTRAMUSCULAR; INTRAVENOUS; SUBCUTANEOUS at 12:03

## 2022-04-19 RX ADMIN — PIPERACILLIN AND TAZOBACTAM 3375 MG: 3; .375 INJECTION, POWDER, LYOPHILIZED, FOR SOLUTION INTRAVENOUS at 03:17

## 2022-04-19 RX ADMIN — MAGNESIUM SULFATE HEPTAHYDRATE 2000 MG: 2 INJECTION, SOLUTION INTRAVENOUS at 09:05

## 2022-04-19 RX ADMIN — ENOXAPARIN SODIUM 40 MG: 100 INJECTION SUBCUTANEOUS at 08:56

## 2022-04-19 RX ADMIN — HYDROMORPHONE HYDROCHLORIDE 0.25 MG: 1 INJECTION, SOLUTION INTRAMUSCULAR; INTRAVENOUS; SUBCUTANEOUS at 07:21

## 2022-04-19 RX ADMIN — SODIUM CHLORIDE, POTASSIUM CHLORIDE, SODIUM LACTATE AND CALCIUM CHLORIDE: 600; 310; 30; 20 INJECTION, SOLUTION INTRAVENOUS at 06:56

## 2022-04-19 ASSESSMENT — PAIN DESCRIPTION - ONSET: ONSET: PROGRESSIVE

## 2022-04-19 ASSESSMENT — PAIN DESCRIPTION - PAIN TYPE: TYPE: SURGICAL PAIN

## 2022-04-19 ASSESSMENT — PAIN SCALES - GENERAL
PAINLEVEL_OUTOF10: 7
PAINLEVEL_OUTOF10: 5
PAINLEVEL_OUTOF10: 7
PAINLEVEL_OUTOF10: 7
PAINLEVEL_OUTOF10: 6
PAINLEVEL_OUTOF10: 5
PAINLEVEL_OUTOF10: 6
PAINLEVEL_OUTOF10: 7
PAINLEVEL_OUTOF10: 10
PAINLEVEL_OUTOF10: 10

## 2022-04-19 ASSESSMENT — PAIN DESCRIPTION - LOCATION: LOCATION: ABDOMEN

## 2022-04-19 ASSESSMENT — PAIN DESCRIPTION - DESCRIPTORS: DESCRIPTORS: CONSTANT;SORE;TENDER

## 2022-04-19 ASSESSMENT — PAIN DESCRIPTION - FREQUENCY: FREQUENCY: INTERMITTENT

## 2022-04-19 ASSESSMENT — PAIN DESCRIPTION - PROGRESSION: CLINICAL_PROGRESSION: GRADUALLY WORSENING

## 2022-04-19 ASSESSMENT — PAIN DESCRIPTION - ORIENTATION: ORIENTATION: MID;LOWER

## 2022-04-19 NOTE — PROGRESS NOTES
Patient arrived to room 5324 around 2300. Pt a/o x4. Temp was 100.2 but currently is 98.7. All other VSS, on room air. Lap sites are CDI, abd binder on. Denies nausea and is tolerating ice chips at this time. Pain controlled with PRN dilaudid. Alba in place with good output. Patient using IS. Will continue to monitor.

## 2022-04-19 NOTE — PROGRESS NOTES
PACU Transfer Note    Pt meets criteria as per Geeta Score and ASPAN Standards to transfer to next phase of care. No results for input(s): POCGLU in the last 72 hours. Vitals:    04/18/22 2218   BP:    Pulse:    Resp:    Temp: 99 °F (37.2 °C)   SpO2:      BP within   20% of pt's admitting BP as per GEETA SCORE    SpO2: 95 %    O2 Flow Rate (L/min): 4 L/min      Intake/Output Summary (Last 24 hours) at 4/18/2022 2221  Last data filed at 4/18/2022 2139  Gross per 24 hour   Intake 2000 ml   Output 225 ml   Net 1775 ml         Handoff report given at bedside.    Family updated and directed to pt room      4/18/2022 10:21 PM

## 2022-04-19 NOTE — PROGRESS NOTES
Physician Progress Note      PATIENT:               Steven Peterson  CSN #:                  [id number]  :                       1960  ADMIT DATE:       2022 4:00 PM  [address] [place] DATE:  RESPONDING  PROVIDER #:        Radha Longoria DO          QUERY TEXT:    Pt admitted with perforated appendix. Pt noted to have increased WBC, temp, RR   and HR. If possible, please document in the progress notes and discharge   summary if you are evaluating and /or treating any of the following: The medical record reflects the following:  Risk Factors: 64 yo w/ perforated appendix/appendicitis  Clinical Indicators: Per PN : s/p Laparoscopic ruptured appendectomy. WBC   4.5, temp 101.4, RR 21, . Treatment: IVF bolus X2, Laparoscopic appendectomy, IV Zosyn  Options provided:  -- Sepsis due to perforated appendix/appendicitis present on admission  -- Appendicitis without Sepsis  -- Other - I will add my own diagnosis  -- Disagree - Not applicable / Not valid  -- Disagree - Clinically unable to determine / Unknown  -- Refer to Clinical Documentation Reviewer    PROVIDER RESPONSE TEXT:    This patient has sepsis due to perforated appendix/appendicitis which was   present on admission.     Query created by: Divina Cazares on 2022 7:54 AM      Electronically signed by:  Radha Longoria DO 2022 12:49 PM

## 2022-04-19 NOTE — CARE COORDINATION
CM following, spoke with pt at bedside, pt from home but has family support. POD#1 lap appy plans to DC home no needs anticipated at this time, will cont to follow for DC needs. Pt sister plans to Drive pt home once cleared for DC.   Electronically signed by Zurdo Rose RN on 4/19/2022 at 12:02 PM   [phone]

## 2022-04-19 NOTE — OP NOTE
Appendectomy, Lap, Procedure Note    Indications: The patient presented with a history of right-sided abdominal pain. A CT scan and Physical Exam revealed findings consistent with acute appendicitis. Pre-operative Diagnosis: Acute Appendicitis. Post-operative Diagnosis: Ruptured Appendicitis    Surgeon: Pam Veliz D.O. Assistant: Viviana Montelongo      Anesthesia: General endotracheal anesthesia      Procedure Details   The patient was seen again in the Holding Room. The risks, benefits, complications, treatment options, and expected outcomes were discussed with the patient and/or family. The possibilities of reaction to medication, pulmonary aspiration, perforation of viscus, bleeding, recurrent infection, finding a normal appendix, the need for additional procedures, failure to diagnose a condition, and creating a complication requiring transfusion or operation were discussed. There was concurrence with the proposed plan and informed consent was obtained. The site of surgery was properly noted/marked. The patient was taken to Operating Room, identified as Bertha Eli and the procedure verified as Appendectomy. A Time Out was held and the above information confirmed. The patient was placed in the supine position and general anesthesia was induced, along with placement of orogastric tube, SCD's, and a Alba catheter. IV Antibiotics given pre-operatively. The abdomen was prepped and draped in a sterile fashion. A small incision was made in Left mid-clavicular line at Thrasher's point and peritoneal cavity was accessed using the Veress needle technique. The pneumoperitoneum was then established to steady pressure of 15 mmHg which the patient tolerated. A 5 mm Versaport was placed through this incision. Additional 12 mm cannula then placed in the left lower quadrant of the abdomen and  Another 5 mm port placed half way between the umbilicus and pubic symphysis under direct vision.  A careful evaluation of the entire abdomen was carried out. Greenish-brown murky fluid appreciated in the pelvis and right paracolic gutter indicated rupture of appendix. This was all suctioned and 1 liter of irrigation used at the end of the case for this. The patient was placed in Trendelenburg and left lateral decubitus position. The small intestines were retracted in the cephalad and left lateral direction away from the pelvis and right lower quadrant. The patient was found have an enlarged and inflamed appendix that was in retrocecal position. There was clear evidence of perforation. The appendix was carefully dissected away from attachments with mobilization of the cecum. A window was made in the mesoappendix at the base of the appendix. The mesoappendix was dissected and sealed using ligasure device. The appendix was divided at its base using a 60mm white load of the stapler, at the base flush with the cecum. There was no evidence of bleeding, leakage, or complication after division of the appendix. Irrigation was also performed and irrigate suctioned from the abdomen as well. Appendix removed using endo catch bag. The 12 port site was closed using 0.0 Vicryl  suture at the level of the fascia. The trocar site skin wounds were closed using 4.0 Vicryl after copious Irrigation of the wounds. Local Anesthetic used at port sites then Dermabond applied. Instrument, sponge, and needle counts were correct at the conclusion of the case. Findings: The appendix was found to be inflamed. There was signs of necrosis. There was perforation. There was purulence    Estimated Blood Loss:  Minimal           Drains: non           Specimens: Appendix           Complications:  None; patient tolerated the procedure well. Disposition: PACU - hemodynamically stable. Condition: stable    Attending Attestation: I was present and scrubbed for the entire procedure.

## 2022-04-19 NOTE — PROGRESS NOTES
Alba catheter removed per order. Patient tolerated well. No complaints of discomfort. Hat placed in toilet for measurement of output.

## 2022-04-19 NOTE — PROGRESS NOTES
S/p LAPAROSCOPIC RUPTURED APPENDECTOMY. Admitted to PACU bed 10 from OR. Arrived at 2130 . Attached to PACU monitoring system. Alarms and parameters set. Report received from anesthesia personnel. No complication reported intraoperatively. Pt on nasal cannula with oxygen at 4 liters. Athrombic wraps in place. VSS; will continue to monitor.

## 2022-04-19 NOTE — BRIEF OP NOTE
Brief Postoperative Note      Patient: Bebo Bunch  YOB: 1960  MRN: [id number]    Date of Procedure: 4/18/2022    Pre-Op Diagnosis: ACUTE APPENDICITIS    Post-Op Diagnosis: Same, perforated       Procedure(s):  LAPAROSCOPIC RUPTURED APPENDECTOMY    Surgeon(s): Aissatou Jaevd DO    Assistant:  Resident: Lauren Garces MD    Anesthesia: General    Estimated Blood Loss (mL): Minimal    Complications: None    Specimens:   ID Type Source Tests Collected by Time Destination   A : REINA Simmons 8, DO 4/18/2022 2103        Implants:  * No implants in log *      Drains:   Urethral Catheter Non-latex 16 fr (Active)       Findings: acutely perforated appendix    Electronically signed by Lauren Garces MD on 4/18/2022 at 9:20 PM

## 2022-04-19 NOTE — ANESTHESIA PRE PROCEDURE
Department of Anesthesiology  Preprocedure Note       Name:  Sudha Powers   Age:  63 y.o.  :  1960                                          MRN:  [id number]         Date:  2022      Surgeon: Teetee Tellez): Robyn Maharaj DO    Procedure: Procedure(s):  APPENDECTOMY LAPAROSCOPIC    Medications prior to admission:   Prior to Admission medications    Medication Sig Start Date End Date Taking?  Authorizing Provider   hydrochlorothiazide (HYDRODIURIL) 25 MG tablet Take 1 tablet by mouth daily 19   Historical Provider, MD   levothyroxine (SYNTHROID) 125 MCG tablet Take 1 tablet by mouth daily 19   Historical Provider, MD   lisinopril (PRINIVIL;ZESTRIL) 40 MG tablet Take 1 tablet by mouth daily 19   Historical Provider, MD   venlafaxine (EFFEXOR XR) 75 MG extended release capsule Take 75 mg by mouth daily 3/27/19   Historical Provider, MD   venlafaxine (EFFEXOR XR) 37.5 MG extended release capsule Take 1 capsule by mouth daily 19   Historical Provider, MD   traZODone (DESYREL) 50 MG tablet Take 1 tablet by mouth daily 19   Historical Provider, MD   Multiple Vitamin (MULTI-VITAMIN DAILY PO) Take 1 tablet by mouth daily    Historical Provider, MD   vitamin D (CHOLECALCIFEROL) 1000 UNIT TABS tablet Take 1,000 Units by mouth daily    Historical Provider, MD       Current medications:    Current Facility-Administered Medications   Medication Dose Route Frequency Provider Last Rate Last Admin    piperacillin-tazobactam (ZOSYN) 4,500 mg in dextrose 5 % 100 mL IVPB (mini-bag)  4,500 mg IntraVENous Once Zaria Sosa  mL/hr at 22 4,500 mg at 22    Followed by   Barb Baker ON 2022] piperacillin-tazobactam (ZOSYN) 3,375 mg in dextrose 5 % 50 mL IVPB (mini-bag)  3,375 mg IntraVENous Q8H Zaria Sosa MD        lactated ringers infusion   IntraVENous Continuous Navarro Kiran DO        sodium chloride flush 0.9 % injection 5-40 mL  5-40 mL IntraVENous 2 times per day Michell Ficks, DO        sodium chloride flush 0.9 % injection 5-40 mL  5-40 mL IntraVENous PRN Michell Ficks, DO        0.9 % sodium chloride infusion   IntraVENous PRN Michell Ficks, DO        meperidine (DEMEROL) injection 12.5 mg  12.5 mg IntraVENous Q5 Min PRN Michell Ficks, DO        HYDROmorphone (DILAUDID) injection 0.25 mg  0.25 mg IntraVENous Q5 Min PRN Michell Ficks, DO        HYDROmorphone (DILAUDID) injection 0.5 mg  0.5 mg IntraVENous Q5 Min PRN Michell Ficks, DO        ondansetron [facility]) injection 4 mg  4 mg IntraVENous Once PRN Michell Ficks, DO        prochlorperazine (COMPAZINE) 5 mg in sodium chloride (PF) 10 mL injection  5 mg IntraVENous Once PRN Michell Ficks, DO        diphenhydrAMINE (BENADRYL) injection 12.5 mg  12.5 mg IntraVENous Once PRN Michell Ficks, DO        hydrALAZINE (APRESOLINE) injection 10 mg  10 mg IntraVENous Q15 Min PRN Michell Ficks, DO         Current Outpatient Medications   Medication Sig Dispense Refill    hydrochlorothiazide (HYDRODIURIL) 25 MG tablet Take 1 tablet by mouth daily  1    levothyroxine (SYNTHROID) 125 MCG tablet Take 1 tablet by mouth daily  0    lisinopril (PRINIVIL;ZESTRIL) 40 MG tablet Take 1 tablet by mouth daily  2    venlafaxine (EFFEXOR XR) 75 MG extended release capsule Take 75 mg by mouth daily      venlafaxine (EFFEXOR XR) 37.5 MG extended release capsule Take 1 capsule by mouth daily  3    traZODone (DESYREL) 50 MG tablet Take 1 tablet by mouth daily  0    Multiple Vitamin (MULTI-VITAMIN DAILY PO) Take 1 tablet by mouth daily      vitamin D (CHOLECALCIFEROL) 1000 UNIT TABS tablet Take 1,000 Units by mouth daily         Allergies:  No Known Allergies    Problem List:  There is no problem list on file for this patient. Past Medical History:  History reviewed. No pertinent past medical history.     Past Surgical History:        Procedure Laterality Date    ANTERIOR CRUCIATE LIGAMENT REPAIR Right     1992, 2 arthroscopic surgery prior the repair    HYSTERECTOMY, TOTAL ABDOMINAL      2011- fibroid uterus       Social History:    Social History     Tobacco Use    Smoking status: Never Smoker    Smokeless tobacco: Never Used   Substance Use Topics    Alcohol use: Yes     Comment: few times a week                                Counseling given: Not Answered      Vital Signs (Current):   Vitals:    04/18/22 1845 04/18/22 1900 04/18/22 1915 04/18/22 1930   BP: (!) 153/88 (!) 149/94 (!) 163/96 (!) 165/96   Pulse: 118 120 124 126   Resp: 25 21 24 25   Temp:       TempSrc:       SpO2: 97% 95% 95% 95%   Weight:       Height:                                                  BP Readings from Last 3 Encounters:   04/18/22 (!) 165/96   06/26/19 124/86       NPO Status: Time of last liquid consumption: 1300                        Time of last solid consumption: 1300                        Date of last liquid consumption: 04/18/22                        Date of last solid food consumption: 04/18/22    BMI:   Wt Readings from Last 3 Encounters:   04/18/22 220 lb 7.4 oz (100 kg)   06/26/19 216 lb (98 kg)     Body mass index is 37.84 kg/m². CBC:   Lab Results   Component Value Date    WBC 14.5 04/18/2022    RBC 4.68 04/18/2022    HGB 13.6 04/18/2022    HCT 39.5 04/18/2022    MCV 84.5 04/18/2022    RDW 13.9 04/18/2022     04/18/2022       CMP:   Lab Results   Component Value Date     04/18/2022    K 3.6 04/18/2022     04/18/2022    CO2 20 04/18/2022    BUN 12 04/18/2022    CREATININE 0.8 04/18/2022    GFRAA >60 04/18/2022    LABGLOM >60 04/18/2022    GLUCOSE 149 04/18/2022    PROT 7.8 04/18/2022    CALCIUM 10.6 04/18/2022    BILITOT 0.6 04/18/2022    ALKPHOS 199 04/18/2022    AST 26 04/18/2022    ALT 44 04/18/2022       POC Tests: No results for input(s): POCGLU, POCNA, POCK, POCCL, POCBUN, POCHEMO, POCHCT in the last 72 hours.     Coags:   Lab Results   Component Value Date    PROTIME 13.1 04/18/2022    INR 1.15 04/18/2022       HCG (If Applicable): No results found for: PREGTESTUR, PREGSERUM, HCG, HCGQUANT     ABGs: No results found for: PHART, PO2ART, LBK6LFV, AXI5ONY, BEART, S2NSDLKW     Type & Screen (If Applicable):  No results found for: LABABO, LABRH    Drug/Infectious Status (If Applicable):  No results found for: HIV, HEPCAB    COVID-19 Screening (If Applicable): No results found for: COVID19        Anesthesia Evaluation  Patient summary reviewed and Nursing notes reviewed no history of anesthetic complications:   Airway: Mallampati: II  TM distance: >3 FB   Neck ROM: full  Mouth opening: > = 3 FB Dental: normal exam         Pulmonary: breath sounds clear to auscultation      (-) not a current smoker (never)                           Cardiovascular:  Exercise tolerance: good (>4 METS),       (-) past MI    NYHA Classification: II    Rhythm: regular  Rate: normal           Beta Blocker:  Not on Beta Blocker         Neuro/Psych:   Negative Neuro/Psych ROS              GI/Hepatic/Renal:        (-) GERD       Endo/Other: Negative Endo/Other ROS                    Abdominal:             Vascular: negative vascular ROS. Other Findings:             Anesthesia Plan      general     ASA 2 - emergent       Induction: intravenous. MIPS: Prophylactic antiemetics administered. Anesthetic plan and risks discussed with patient.         Attending anesthesiologist reviewed and agrees with Preprocedure content              Allie Rushing DO   4/18/2022

## 2022-04-19 NOTE — PROGRESS NOTES
Surgery Daily Progress Note  Emeli Mendez  CC:  Ruptured appendix  Subjective :  No c/o nausea/vomiting. Having some pain but well controlled with current regimen. Using IS. Objective    Infusions:   lactated ringers      sodium chloride          I/O:No intake/output data recorded. Wt Readings from Last 1 Encounters:   04/18/22 220 lb 7.4 oz (100 kg)                 LABS:    Recent Labs     04/18/22  1539   WBC 14.5*   HGB 13.6   HCT 39.5   MCV 84.5           Recent Labs     04/18/22  1539      K 3.6      CO2 20*   BUN 12   CREATININE 0.8        Recent Labs     04/18/22  1534   AST 26   ALT 44*   BILIDIR <0.2   BILITOT 0.6   ALKPHOS 199*        Recent Labs     04/18/22  1539   LIPASE 23.0        Recent Labs     04/18/22  1534 04/18/22  1901   PROT 7.8  --    INR  --  1.15*      No results for input(s): CKTOTAL, CKMB, CKMBINDEX, TROPONINI in the last 72 hours.             Exam:/77   Pulse 100   Temp 98.7 °F (37.1 °C) (Oral)   Resp 14   Ht 5' 4\" (1.626 m)   Wt 220 lb 7.4 oz (100 kg)   SpO2 96%   BMI 37.84 kg/m²   General appearance: alert, appears stated age and cooperative  Lungs: symmetrical chest rise  Heart: regular rate and rhythm, S1, S2   Abdomen: soft, appropriately-tender; bowel sounds present      ASSESSMENT/PLAN: Pt. is a 63 y.o. female s/p Laparoscopic ruptured appendectomy POD#1    NPO  With sips and popsicles  Continue zosyn  Continue IS and pulm toilet  OOB and ambulate      SHAE Hawley CNP 4/19/2022 6:26 AM  [phone]

## 2022-04-19 NOTE — ADT AUTH CERT
Utilization Reviews         Appendectomy, with Abscess or Peritonitis, by Laparoscopy - Care Day 1 (4/18/2022) by Christopher Toro RN       Review Status Review Entered   Completed 4/19/2022 11:07      Criteria Review      Care Day: 1 Care Date: 4/18/2022 Level of Care: Inpatient Floor    Guideline Day 1    Level Of Care    (X) OR to floor    4/19/2022 11:07 AM EDT by Shey Sandra      med surg    Clinical Status    (X) * Clinical Indications met    4/19/2022 11:07 AM EDT by Shey Sandra      Laparoscopic ruptured appendectomy    Activity    (X) Activity as tolerated postoperatively    4/19/2022 11:07 AM EDT by Shey Sandra      ambulate q shift  up as tolerated    Routes    (X) IV fluids    4/19/2022 11:07 AM EDT by Tayler Kamara      LR 1000ml once IV    (X) IV medications    4/19/2022 11:07 AM EDT by Tayler Kamara      lovenox 40mg once SC  di;laudid 0.5mg once IV  dilaudid 1mg once IV  zofran 4mg once PO  tylenol 650mg q4hr prn RE-x1  marcaine 10ml intradermal  dilaudid 0.25mg q5min prn IV-x3    Medications    (X) IV antibiotics    4/19/2022 11:07 AM EDT by Gaviota hornsyn 4.5g once IV    * Milestone   Additional Notes   DATE:    04/18      Chief Complaint/ED Presentation:   Abdominal Pain (RLQ radiates up to shoulder)      Vitals:   101.4f, 20-31r, 103-126 p, 149/98, 97% RA      Abnl/Pertinent Labs/Radiology/Diagnostic Studies:   CT abd   1. Dilated appendix with periappendiceal inflammatory change representing acute, uncomplicated appendicitis. 2. Lobulated cystic structure of the pelvis which may emanate from the left ovary.  Follow-up pelvic ultrasound is recommended.        Alk Phos: 199 (H)   ALT: 44 (H)      CO2: 20 (L)   GLUCOSE, FASTING,GF: 149 (H)   WBC: 14.5 (H)   Neutrophils Absolute: 12.3 (H)      Prothrombin Time: 13.1 (H)   INR: 1.15 (H)   Epithelial Cells, UA:  (A)         ED treatment:   Jennie Hankins is a 63 y.o. female with history of hypothyroidism, anxiety, depression, colon polyps (2/25/2014), HTN, hypercholesterolemia, obesity, and hyperglycemia who presents with abdominal pain that started in her RUQ yesterday and ahs migrated to her RLQ today with 1 episode of emesis and vomiting. On exam, patient has McBurney point tenderness and positive Rovsing sign.       Patient received Dilaudid for the pain.  Patient was given 1 L LR IVF for tachycardia.       Lipase within normal limits and no electrolyte abnormalities.  Leukocytosis.  Based on the history, physical exam findings, and leukocytosis, CT abdomen and pelvis with IV contrast was completed and showed:   1. Dilated appendix with periappendiceal inflammatory change representing acute, uncomplicated appendicitis. 2. Lobulated cystic structure of the pelvis which may emanate from the left ovary. Follow-up pelvic ultrasound is recommended. Hepatic function panel and urinalysis pending.  At this time, I do believe patient's symptoms are due to appendicitis.          Admission Diagnosis/Op Note:       Date of Procedure: 4/18/2022       Pre-Op Diagnosis: ACUTE APPENDICITIS       Post-Op Diagnosis: Same, perforated         Procedure(s):   LAPAROSCOPIC RUPTURED APPENDECTOMY      Pertinent Medical History:   History of Present Illness:    Esther Guillen is a 63 y.o. female with Hx as delineated below presents with epigastric and periumbilical pain that now worse in the RLQ. Associated symptoms include nausea and vomiting. Denies fever, dyspnea or dysuria. Denies previous symptoms. Pt stated her last colonoscopy was 3 years ago with benign polyp. Takes alevel 3-4 times a week for arthritis.  Denies AC or AP.         Physical Exam:   General appearance: alert, no acute distress, grooming appropriate   Eyes: PERRLA, no scleral icterus   Neck: trachea midline, no JVD, no lymphadenopathy   Chest/Lungs:, normal effort   Cardiovascular: mild tachycardia, regular   Abdomen: soft, RLQ moderately-tender, mild tenderness in the LLQ, non-distended, +BS, no guarding/rigidity   : deferred    Skin: warm and dry, no rashes   Extremities: no edema, no cyanosis   Neuro: A&Ox3, no focal deficits, sensation intact      MD Consults/Assessments & Plans:   Assessment/Plan: This is a 63 y.o. female with abdominal pain and CT finding consistent with appendicitis. Will plan for surgery tonight.    - Schedule for lap appendectomy possible open   - Diet/IVF: NPO,  ml/hr   - PT/INR   - Antibiotic: zosyn   - Pregnancy test is not indicated   - Consent was obtained. Risks, benefits, and alternatives were discussed at length with the patient and family. All questions and concerns were answered. They agree to above procedure.    - Anesthesia to see patient.              Appendectomy, with Abscess or Peritonitis, by Laparoscopy - Clinical Indications for Procedure by Dixie Flores RN       Review Status Review Entered   Completed 4/19/2022 11:01      Criteria Review      Clinical Indications for Procedure    Most Recent : Jae Chatterjee Most Recent Date: 4/19/2022 11:01 AM EDT    (X) Procedure is indicated for  1 or more  of the following  (1) (2) (3) (4) (5):       (X) Suspected acute appendicitis       4/19/2022 11:01 AM EDT by Farhat Guerrero ruptured appendectomy

## 2022-04-19 NOTE — PROGRESS NOTES
Bariatric Surgery  Post-operative Note      Procedure(s) Performed: Laparoscopic ruptured appendectomy    Subjective:   Patient reports her pain has been well controlled. Patient reports some intermittent nausea, no vomiting. Patient has been tolerating ice chips. Patient has not been out of bed. Patient is voiding through Alba. Patient denies any flatus or bowel movements. Objective:  Anesthesia type: General      I/O    Intra op    Post op     Fluids   2000 mL  500 mL     EBL  25 mL 0 mL     Urine  200 mL  not recorded     Vitals:   Vitals:    04/18/22 2150 04/18/22 2215 04/18/22 2218 04/18/22 2313   BP: 119/74 115/70  122/75   Pulse: 116 116  114   Resp: 25 20  14   Temp:  99.4 °F (37.4 °C) 99 °F (37.2 °C) 100.2 °F (37.9 °C)   TempSrc:   Temporal Oral   SpO2:  95%  95%   Weight:       Height:           Physical Exam:  Post-op vital signs:  Stable   General appearance: alert, no acute distress, grooming appropriate  Eyes: No scleral icterus, EOM grossly intact  Neck: trachea midline, no JVD, no lymphadenopathy, neck supple  Chest/Lungs: Normal effort with no accessory muscle use on 2L NC  Cardiovascular: Tachycardic, regular rhythm  Abdomen: Soft, appropriately-tender, incisions c/d/i and well approximated with Dermabond; abdominal binder in place  Skin: warm and dry, no rashes  Extremities: no edema, no cyanosis  Genitourinary: Alba in place with clear yellow urine  Neuro: A&Ox3, no focal deficits, sensation intact    Assessment and Plan  This is a 63y.o. year old female status post laparoscopic perforated appendectomy secondary to perforated appendicitis. (4/18) POD0.     Pain management: Dilaudid pain panel  Cardiovasc: hemodynamically stable, will continue to monitor  Respiratory:  IS ordered to bedside, encourage hourly IS and deep breathing, wean oxygen as tolerated  Fluids:  LR 125mL/hr, Diet: NPO  : Urine output is adequate   Ambulation: OOB to chair, encourage ambulation  Prophylaxis: SCDs, lovenox  Antibiotics: Continue IV Zosyn due to perforated appendicitis  Wound: Local wound care      Sara Delgado DO  PGY1, General Surgery  04/19/22  12:30 AM  [phone]

## 2022-04-19 NOTE — PROGRESS NOTES
Patient remains alert and oriented x4. VSS. On RA. C/o of pain throughout shift. PRN pain medication given with benefit. IV fluids infusing per order. All medications given without issue. Patient able to ambulate with standby assistance. Abd binder on. Patient able to tolerate ice chips, sips of water with medications, and a popsicle without any c/o nausea or episodes of vomiting. Call light within reach, will continue to monitor.

## 2022-04-20 ENCOUNTER — APPOINTMENT (OUTPATIENT)
Dept: GENERAL RADIOLOGY | Age: 62
DRG: 853 | End: 2022-04-20
Payer: COMMERCIAL

## 2022-04-20 LAB
ALBUMIN SERPL-MCNC: 3.3 G/DL (ref 3.4–5)
ANION GAP SERPL CALCULATED.3IONS-SCNC: 11 MMOL/L (ref 3–16)
BASOPHILS ABSOLUTE: 0 K/UL (ref 0–0.2)
BASOPHILS RELATIVE PERCENT: 0.2 %
BUN BLDV-MCNC: 10 MG/DL (ref 7–20)
CALCIUM SERPL-MCNC: 9.4 MG/DL (ref 8.3–10.6)
CHLORIDE BLD-SCNC: 105 MMOL/L (ref 99–110)
CO2: 24 MMOL/L (ref 21–32)
CREAT SERPL-MCNC: 0.9 MG/DL (ref 0.6–1.1)
EOSINOPHILS ABSOLUTE: 0 K/UL (ref 0–0.6)
EOSINOPHILS RELATIVE PERCENT: 0.1 %
GFR AFRICAN AMERICAN: >60
GFR NON-AFRICAN AMERICAN: >60
GLUCOSE BLD-MCNC: 122 MG/DL (ref 70–99)
HCT VFR BLD CALC: 37 % (ref 36–48)
HEMOGLOBIN: 12.6 G/DL (ref 12–16)
LYMPHOCYTES ABSOLUTE: 1.3 K/UL (ref 1–5.1)
LYMPHOCYTES RELATIVE PERCENT: 10.6 %
MAGNESIUM: 2.1 MG/DL (ref 1.8–2.4)
MCH RBC QN AUTO: 29 PG (ref 26–34)
MCHC RBC AUTO-ENTMCNC: 34.1 G/DL (ref 31–36)
MCV RBC AUTO: 85.3 FL (ref 80–100)
MONOCYTES ABSOLUTE: 0.4 K/UL (ref 0–1.3)
MONOCYTES RELATIVE PERCENT: 3.1 %
NEUTROPHILS ABSOLUTE: 10.6 K/UL (ref 1.7–7.7)
NEUTROPHILS RELATIVE PERCENT: 86 %
PDW BLD-RTO: 14.2 % (ref 12.4–15.4)
PHOSPHORUS: 2.8 MG/DL (ref 2.5–4.9)
PLATELET # BLD: 304 K/UL (ref 135–450)
PMV BLD AUTO: 7.9 FL (ref 5–10.5)
POTASSIUM SERPL-SCNC: 3.7 MMOL/L (ref 3.5–5.1)
RBC # BLD: 4.34 M/UL (ref 4–5.2)
SODIUM BLD-SCNC: 140 MMOL/L (ref 136–145)
WBC # BLD: 12.4 K/UL (ref 4–11)

## 2022-04-20 PROCEDURE — 36415 COLL VENOUS BLD VENIPUNCTURE: CPT

## 2022-04-20 PROCEDURE — A4216 STERILE WATER/SALINE, 10 ML: HCPCS | Performed by: STUDENT IN AN ORGANIZED HEALTH CARE EDUCATION/TRAINING PROGRAM

## 2022-04-20 PROCEDURE — 1200000000 HC SEMI PRIVATE

## 2022-04-20 PROCEDURE — 6360000002 HC RX W HCPCS: Performed by: STUDENT IN AN ORGANIZED HEALTH CARE EDUCATION/TRAINING PROGRAM

## 2022-04-20 PROCEDURE — 2580000003 HC RX 258: Performed by: STUDENT IN AN ORGANIZED HEALTH CARE EDUCATION/TRAINING PROGRAM

## 2022-04-20 PROCEDURE — 80069 RENAL FUNCTION PANEL: CPT

## 2022-04-20 PROCEDURE — 2500000003 HC RX 250 WO HCPCS: Performed by: STUDENT IN AN ORGANIZED HEALTH CARE EDUCATION/TRAINING PROGRAM

## 2022-04-20 PROCEDURE — 6370000000 HC RX 637 (ALT 250 FOR IP): Performed by: STUDENT IN AN ORGANIZED HEALTH CARE EDUCATION/TRAINING PROGRAM

## 2022-04-20 PROCEDURE — 83735 ASSAY OF MAGNESIUM: CPT

## 2022-04-20 PROCEDURE — 2500000003 HC RX 250 WO HCPCS

## 2022-04-20 PROCEDURE — 74018 RADEX ABDOMEN 1 VIEW: CPT

## 2022-04-20 PROCEDURE — 85025 COMPLETE CBC W/AUTO DIFF WBC: CPT

## 2022-04-20 RX ORDER — FAMOTIDINE 10 MG/ML
20 INJECTION, SOLUTION INTRAVENOUS 2 TIMES DAILY
Status: DISCONTINUED | OUTPATIENT
Start: 2022-04-20 | End: 2022-04-20

## 2022-04-20 RX ORDER — FAMOTIDINE 10 MG/ML
20 INJECTION, SOLUTION INTRAVENOUS 2 TIMES DAILY PRN
Status: DISCONTINUED | OUTPATIENT
Start: 2022-04-20 | End: 2022-04-20

## 2022-04-20 RX ORDER — CALCIUM CARBONATE 200(500)MG
500 TABLET,CHEWABLE ORAL 3 TIMES DAILY PRN
Status: DISCONTINUED | OUTPATIENT
Start: 2022-04-20 | End: 2022-04-24 | Stop reason: HOSPADM

## 2022-04-20 RX ADMIN — HYDROMORPHONE HYDROCHLORIDE 1 MG: 1 INJECTION, SOLUTION INTRAMUSCULAR; INTRAVENOUS; SUBCUTANEOUS at 09:58

## 2022-04-20 RX ADMIN — SODIUM CHLORIDE, POTASSIUM CHLORIDE, SODIUM LACTATE AND CALCIUM CHLORIDE: 600; 310; 30; 20 INJECTION, SOLUTION INTRAVENOUS at 22:46

## 2022-04-20 RX ADMIN — SODIUM CHLORIDE, POTASSIUM CHLORIDE, SODIUM LACTATE AND CALCIUM CHLORIDE: 600; 310; 30; 20 INJECTION, SOLUTION INTRAVENOUS at 09:56

## 2022-04-20 RX ADMIN — METHOCARBAMOL 500 MG: 100 INJECTION, SOLUTION INTRAMUSCULAR; INTRAVENOUS at 17:48

## 2022-04-20 RX ADMIN — ACETAMINOPHEN 1000 MG: 500 TABLET ORAL at 19:06

## 2022-04-20 RX ADMIN — FAMOTIDINE 20 MG: 10 INJECTION, SOLUTION INTRAVENOUS at 20:58

## 2022-04-20 RX ADMIN — ONDANSETRON 4 MG: 2 INJECTION INTRAMUSCULAR; INTRAVENOUS at 22:24

## 2022-04-20 RX ADMIN — PIPERACILLIN AND TAZOBACTAM 3375 MG: 3; .375 INJECTION, POWDER, LYOPHILIZED, FOR SOLUTION INTRAVENOUS at 19:10

## 2022-04-20 RX ADMIN — FAMOTIDINE 20 MG: 10 INJECTION, SOLUTION INTRAVENOUS at 10:57

## 2022-04-20 RX ADMIN — SODIUM CHLORIDE, POTASSIUM CHLORIDE, SODIUM LACTATE AND CALCIUM CHLORIDE: 600; 310; 30; 20 INJECTION, SOLUTION INTRAVENOUS at 00:52

## 2022-04-20 RX ADMIN — ENOXAPARIN SODIUM 40 MG: 100 INJECTION SUBCUTANEOUS at 08:19

## 2022-04-20 RX ADMIN — PIPERACILLIN AND TAZOBACTAM 3375 MG: 3; .375 INJECTION, POWDER, LYOPHILIZED, FOR SOLUTION INTRAVENOUS at 03:28

## 2022-04-20 RX ADMIN — POTASSIUM PHOSPHATE, MONOBASIC AND POTASSIUM PHOSPHATE, DIBASIC 10 MMOL: 224; 236 INJECTION, SOLUTION, CONCENTRATE INTRAVENOUS at 13:31

## 2022-04-20 RX ADMIN — ANTACID TABLETS 500 MG: 500 TABLET, CHEWABLE ORAL at 20:58

## 2022-04-20 RX ADMIN — FAMOTIDINE 20 MG: 10 INJECTION, SOLUTION INTRAVENOUS at 00:56

## 2022-04-20 RX ADMIN — METHOCARBAMOL 500 MG: 100 INJECTION, SOLUTION INTRAMUSCULAR; INTRAVENOUS at 08:20

## 2022-04-20 RX ADMIN — PIPERACILLIN AND TAZOBACTAM 3375 MG: 3; .375 INJECTION, POWDER, LYOPHILIZED, FOR SOLUTION INTRAVENOUS at 10:41

## 2022-04-20 RX ADMIN — ACETAMINOPHEN 1000 MG: 500 TABLET ORAL at 08:19

## 2022-04-20 RX ADMIN — ACETAMINOPHEN 1000 MG: 500 TABLET ORAL at 13:26

## 2022-04-20 RX ADMIN — METHOCARBAMOL 500 MG: 100 INJECTION, SOLUTION INTRAMUSCULAR; INTRAVENOUS at 23:51

## 2022-04-20 ASSESSMENT — PAIN DESCRIPTION - DESCRIPTORS: DESCRIPTORS: SORE;TENDER

## 2022-04-20 ASSESSMENT — PAIN SCALES - GENERAL
PAINLEVEL_OUTOF10: 6
PAINLEVEL_OUTOF10: 5
PAINLEVEL_OUTOF10: 5

## 2022-04-20 ASSESSMENT — PAIN DESCRIPTION - LOCATION: LOCATION: ABDOMEN

## 2022-04-20 NOTE — PROGRESS NOTES
Surgery Daily Progress Note  Jennie Hankins  CC:  Ruptured appendix      Subjective : Patient rested well overnight, no acute issues. Remains afebrile, HDS. Tolerating sips of clears, denies nausea or vomiting. Passing a lot of flatus, no BM. Objective    Infusions:   lactated ringers 125 mL/hr at 04/20/22 0052    sodium chloride          I/O:I/O last 3 completed shifts: In: 1985 [P.O.:120; I.V.:3375; IV Piggyback:1500]  Out: 3716 [RCQZB:1753; Blood:25]           Wt Readings from Last 1 Encounters:   04/18/22 220 lb 7.4 oz (100 kg)                 LABS:    Recent Labs     04/18/22  1539 04/19/22  0645   WBC 14.5* 13.0*   HGB 13.6 11.6*   HCT 39.5 34.0*   MCV 84.5 86.2    254        Recent Labs     04/18/22  1539 04/19/22  0645    138   K 3.6 3.8    103   CO2 20* 28   PHOS  --  3.8   BUN 12 10   CREATININE 0.8 0.9        Recent Labs     04/18/22  1534   AST 26   ALT 44*   BILIDIR <0.2   BILITOT 0.6   ALKPHOS 199*        Recent Labs     04/18/22  1539   LIPASE 23.0        Recent Labs     04/18/22  1534 04/18/22  1901   PROT 7.8  --    INR  --  1.15*      No results for input(s): CKTOTAL, CKMB, CKMBINDEX, TROPONINI in the last 72 hours.           Exam:/84   Pulse 103   Temp 99.3 °F (37.4 °C) (Oral)   Resp 14   Ht 5' 4\" (1.626 m)   Wt 220 lb 7.4 oz (100 kg)   SpO2 93%   BMI 37.84 kg/m²   General appearance: alert, resting in bed, in NAD  Lungs: symmetrical chest rise, normal effort, no adventitious breath sounds,  Heart: regular rate and rhythm, good distal perfusion  Abdomen: soft, appropriately-tender; incisions c/d/i with dermabond in place, bowel sounds present  Extremities: no edema or cyanosis      ASSESSMENT/PLAN: Pt. is a 63 y.o. female s/p Laparoscopic ruptured appendectomy POD#2    Follow up AM labs  Will advance to [facility] - anticipate advancing to regular diet tomorrow  Continue zosyn for a total of 4 days - plan for discharge possibly Friday on PO antibiotics  Continue IS and pulm toilet  OOB and ambulate      Shae Louis DO 4/20/2022 6:33 AM

## 2022-04-20 NOTE — CARE COORDINATION
Cm following, pt diet advanced to [facility]D, plans to advance to reg diet Thursday. Pt plans to DC home denies needs at this time but CM to follow for needs at DC.   Electronically signed by Steven Landers RN on 4/20/2022 at 4:28 PM   [phone]

## 2022-04-20 NOTE — PROGRESS NOTES
Patient a/o x4. VSS. Pain well controlled with scheduled robaxin and PRN meds. Denies nausea and is tolerating sips of clears and popsicles. Did c/o heartburn, PRN Pepcid given. Patient up independently and ambulated in [facility]. Lap sites are CDI. Will continue to monitor.

## 2022-04-20 NOTE — PROGRESS NOTES
Patient remains alert and oriented x4. VSS. On RA. C/o of pain throughout shift. PRN pain medication given with benefit. IV fluids infusing per order. All medications given without issue. Patient able to ambulate independently. Patient walked several laps around the unit. Patient able to tolerate ice chips, sips of water with medications. Patient has been belching throughout the day with c/o heartburn. PRN medication given. Patient aware of plan of care. Call light within reach, will continue to monitor.

## 2022-04-21 LAB
ALBUMIN SERPL-MCNC: 3.4 G/DL (ref 3.4–5)
ANION GAP SERPL CALCULATED.3IONS-SCNC: 11 MMOL/L (ref 3–16)
BASOPHILS ABSOLUTE: 0.1 K/UL (ref 0–0.2)
BASOPHILS RELATIVE PERCENT: 0.7 %
BUN BLDV-MCNC: 13 MG/DL (ref 7–20)
CALCIUM SERPL-MCNC: 9.6 MG/DL (ref 8.3–10.6)
CHLORIDE BLD-SCNC: 105 MMOL/L (ref 99–110)
CO2: 26 MMOL/L (ref 21–32)
CREAT SERPL-MCNC: 0.9 MG/DL (ref 0.6–1.1)
EOSINOPHILS ABSOLUTE: 0 K/UL (ref 0–0.6)
EOSINOPHILS RELATIVE PERCENT: 0.3 %
GFR AFRICAN AMERICAN: >60
GFR NON-AFRICAN AMERICAN: >60
GLUCOSE BLD-MCNC: 111 MG/DL (ref 70–99)
HCT VFR BLD CALC: 34.3 % (ref 36–48)
HEMOGLOBIN: 12 G/DL (ref 12–16)
LYMPHOCYTES ABSOLUTE: 2.1 K/UL (ref 1–5.1)
LYMPHOCYTES RELATIVE PERCENT: 18.2 %
MAGNESIUM: 2 MG/DL (ref 1.8–2.4)
MCH RBC QN AUTO: 29.6 PG (ref 26–34)
MCHC RBC AUTO-ENTMCNC: 35 G/DL (ref 31–36)
MCV RBC AUTO: 84.6 FL (ref 80–100)
MONOCYTES ABSOLUTE: 0.5 K/UL (ref 0–1.3)
MONOCYTES RELATIVE PERCENT: 4.5 %
NEUTROPHILS ABSOLUTE: 9 K/UL (ref 1.7–7.7)
NEUTROPHILS RELATIVE PERCENT: 76.3 %
PDW BLD-RTO: 13.8 % (ref 12.4–15.4)
PHOSPHORUS: 3.7 MG/DL (ref 2.5–4.9)
PLATELET # BLD: 357 K/UL (ref 135–450)
PMV BLD AUTO: 7.7 FL (ref 5–10.5)
POTASSIUM SERPL-SCNC: 3.5 MMOL/L (ref 3.5–5.1)
RBC # BLD: 4.05 M/UL (ref 4–5.2)
SODIUM BLD-SCNC: 142 MMOL/L (ref 136–145)
WBC # BLD: 11.8 K/UL (ref 4–11)

## 2022-04-21 PROCEDURE — 85025 COMPLETE CBC W/AUTO DIFF WBC: CPT

## 2022-04-21 PROCEDURE — 83735 ASSAY OF MAGNESIUM: CPT

## 2022-04-21 PROCEDURE — 2500000003 HC RX 250 WO HCPCS: Performed by: STUDENT IN AN ORGANIZED HEALTH CARE EDUCATION/TRAINING PROGRAM

## 2022-04-21 PROCEDURE — 36415 COLL VENOUS BLD VENIPUNCTURE: CPT

## 2022-04-21 PROCEDURE — 2580000003 HC RX 258: Performed by: STUDENT IN AN ORGANIZED HEALTH CARE EDUCATION/TRAINING PROGRAM

## 2022-04-21 PROCEDURE — 80069 RENAL FUNCTION PANEL: CPT

## 2022-04-21 PROCEDURE — 6370000000 HC RX 637 (ALT 250 FOR IP): Performed by: STUDENT IN AN ORGANIZED HEALTH CARE EDUCATION/TRAINING PROGRAM

## 2022-04-21 PROCEDURE — 6360000002 HC RX W HCPCS: Performed by: STUDENT IN AN ORGANIZED HEALTH CARE EDUCATION/TRAINING PROGRAM

## 2022-04-21 PROCEDURE — A4216 STERILE WATER/SALINE, 10 ML: HCPCS | Performed by: STUDENT IN AN ORGANIZED HEALTH CARE EDUCATION/TRAINING PROGRAM

## 2022-04-21 PROCEDURE — 1200000000 HC SEMI PRIVATE

## 2022-04-21 RX ORDER — POTASSIUM CHLORIDE 7.45 MG/ML
10 INJECTION INTRAVENOUS
Status: COMPLETED | OUTPATIENT
Start: 2022-04-21 | End: 2022-04-21

## 2022-04-21 RX ORDER — PROCHLORPERAZINE EDISYLATE 5 MG/ML
10 INJECTION INTRAMUSCULAR; INTRAVENOUS EVERY 6 HOURS PRN
Status: DISCONTINUED | OUTPATIENT
Start: 2022-04-21 | End: 2022-04-24 | Stop reason: HOSPADM

## 2022-04-21 RX ORDER — SODIUM CHLORIDE, SODIUM LACTATE, POTASSIUM CHLORIDE, AND CALCIUM CHLORIDE .6; .31; .03; .02 G/100ML; G/100ML; G/100ML; G/100ML
1000 INJECTION, SOLUTION INTRAVENOUS ONCE
Status: COMPLETED | OUTPATIENT
Start: 2022-04-21 | End: 2022-04-21

## 2022-04-21 RX ADMIN — SODIUM CHLORIDE: 9 INJECTION, SOLUTION INTRAVENOUS at 19:44

## 2022-04-21 RX ADMIN — POTASSIUM CHLORIDE 10 MEQ: 7.46 INJECTION, SOLUTION INTRAVENOUS at 13:20

## 2022-04-21 RX ADMIN — POTASSIUM CHLORIDE 10 MEQ: 7.46 INJECTION, SOLUTION INTRAVENOUS at 16:25

## 2022-04-21 RX ADMIN — POTASSIUM CHLORIDE 10 MEQ: 7.46 INJECTION, SOLUTION INTRAVENOUS at 11:39

## 2022-04-21 RX ADMIN — PROCHLORPERAZINE EDISYLATE 10 MG: 5 INJECTION, SOLUTION INTRAMUSCULAR; INTRAVENOUS at 03:12

## 2022-04-21 RX ADMIN — PIPERACILLIN AND TAZOBACTAM 3375 MG: 3; .375 INJECTION, POWDER, LYOPHILIZED, FOR SOLUTION INTRAVENOUS at 03:16

## 2022-04-21 RX ADMIN — POTASSIUM CHLORIDE 10 MEQ: 7.46 INJECTION, SOLUTION INTRAVENOUS at 15:22

## 2022-04-21 RX ADMIN — PIPERACILLIN AND TAZOBACTAM 3375 MG: 3; .375 INJECTION, POWDER, LYOPHILIZED, FOR SOLUTION INTRAVENOUS at 19:47

## 2022-04-21 RX ADMIN — FAMOTIDINE 20 MG: 10 INJECTION, SOLUTION INTRAVENOUS at 21:09

## 2022-04-21 RX ADMIN — SODIUM CHLORIDE, POTASSIUM CHLORIDE, SODIUM LACTATE AND CALCIUM CHLORIDE 1000 ML: 600; 310; 30; 20 INJECTION, SOLUTION INTRAVENOUS at 06:48

## 2022-04-21 RX ADMIN — ENOXAPARIN SODIUM 40 MG: 100 INJECTION SUBCUTANEOUS at 08:42

## 2022-04-21 RX ADMIN — FAMOTIDINE 20 MG: 10 INJECTION, SOLUTION INTRAVENOUS at 11:38

## 2022-04-21 RX ADMIN — METHOCARBAMOL 500 MG: 100 INJECTION, SOLUTION INTRAMUSCULAR; INTRAVENOUS at 16:26

## 2022-04-21 RX ADMIN — Medication 1 SPRAY: at 08:41

## 2022-04-21 RX ADMIN — METHOCARBAMOL 500 MG: 100 INJECTION, SOLUTION INTRAMUSCULAR; INTRAVENOUS at 08:41

## 2022-04-21 RX ADMIN — SODIUM CHLORIDE, POTASSIUM CHLORIDE, SODIUM LACTATE AND CALCIUM CHLORIDE: 600; 310; 30; 20 INJECTION, SOLUTION INTRAVENOUS at 06:47

## 2022-04-21 RX ADMIN — PIPERACILLIN AND TAZOBACTAM 3375 MG: 3; .375 INJECTION, POWDER, LYOPHILIZED, FOR SOLUTION INTRAVENOUS at 11:38

## 2022-04-21 RX ADMIN — Medication 1 SPRAY: at 05:25

## 2022-04-21 ASSESSMENT — PAIN DESCRIPTION - FREQUENCY: FREQUENCY: INTERMITTENT

## 2022-04-21 ASSESSMENT — PAIN DESCRIPTION - LOCATION: LOCATION: ABDOMEN

## 2022-04-21 ASSESSMENT — PAIN - FUNCTIONAL ASSESSMENT: PAIN_FUNCTIONAL_ASSESSMENT: ACTIVITIES ARE NOT PREVENTED

## 2022-04-21 ASSESSMENT — PAIN DESCRIPTION - DESCRIPTORS: DESCRIPTORS: CRAMPING

## 2022-04-21 ASSESSMENT — PAIN DESCRIPTION - ORIENTATION: ORIENTATION: RIGHT

## 2022-04-21 ASSESSMENT — PAIN DESCRIPTION - PAIN TYPE: TYPE: ACUTE PAIN

## 2022-04-21 ASSESSMENT — PAIN SCALES - GENERAL: PAINLEVEL_OUTOF10: 1

## 2022-04-21 NOTE — PROGRESS NOTES
Surgery Daily Progress Note  Brandt Pond  CC:  Ruptured appendix      Subjective : Patient with nausea and emesis overnight, required NG placement with about 500 cc output immediately. Mildly tachycardic, but overall HDS. States she feels better this AM, denies abdominal pain, no gas or BM. Objective    Infusions:   lactated ringers 125 mL/hr at 04/20/22 2246    sodium chloride          I/O:I/O last 3 completed shifts: In: 9793 [P.O.:480; I.V.:1375; IV Piggyback:1500]  Out: 1170 [Urine:1170]           Wt Readings from Last 1 Encounters:   04/18/22 220 lb 7.4 oz (100 kg)                 LABS:    Recent Labs     04/19/22  0645 04/20/22  0742   WBC 13.0* 12.4*   HGB 11.6* 12.6   HCT 34.0* 37.0   MCV 86.2 85.3    304        Recent Labs     04/19/22  0645 04/20/22  0742    140   K 3.8 3.7    105   CO2 28 24   PHOS 3.8 2.8   BUN 10 10   CREATININE 0.9 0.9        Recent Labs     04/18/22  1534   AST 26   ALT 44*   BILIDIR <0.2   BILITOT 0.6   ALKPHOS 199*        Recent Labs     04/18/22  1539   LIPASE 23.0        Recent Labs     04/18/22  1534 04/18/22  1901   PROT 7.8  --    INR  --  1.15*      No results for input(s): CKTOTAL, CKMB, CKMBINDEX, TROPONINI in the last 72 hours.           Exam:/84   Pulse 97   Temp 97.4 °F (36.3 °C) (Oral)   Resp 16   Ht 5' 4\" (1.626 m)   Wt 220 lb 7.4 oz (100 kg)   SpO2 95%   BMI 37.84 kg/m²   General appearance: alert, resting in bed, in NAD  Lungs: symmetrical chest rise, normal effort, no adventitious breath sounds,  Heart: regular rate and rhythm, good distal perfusion  Abdomen: soft, appropriately-tender; incisions c/d/i with dermabond in place, bowel sounds present, NG with bilious output  Extremities: no edema or cyanosis      ASSESSMENT/PLAN: Pt. is a 63 y.o. female s/p Laparoscopic ruptured appendectomy POD#3    Continue NG to CLWS, awaiting return of bowel function  Continue Zosyn  Continue IS and pulm toilet  OOB and ambulate    [employer] Jessica Wood DO  PGY3, General Surgery  04/21/22  6:27 AM  [phone]

## 2022-04-21 NOTE — CARE COORDINATION
CM following, pt developed ileus NG placed, pt NPO with IVF IV ABX. Pending return GI function. Plans to DC home no needs anticipated.    Electronically signed by Luis Moser RN on 4/21/2022 at 1:37 PM   [phone]

## 2022-04-21 NOTE — PROGRESS NOTES
VSS. A&Ox4. 1L LR bolus this am. NG 55cm L nare. bilous output. Patient ambulating in hallways. Tolerating well. Up to chair. Reporting some L arm discomfort, given heat packs. Up with minimal assistance. Voiding in hat. Patient reporting gas. Hypoactive bowel sounds in 4 quadrants. freq rounding. No BM. IV potassium replacement. IV abx intermittently . Call light within reach.  Will cont to monitor

## 2022-04-21 NOTE — PROGRESS NOTES
Patient c/o heartburn and was spitting up gastric contents. IV pepcid and tums given. A couple hours later, patient called and said she vomited. She had 500ml of bilious emesis. Zofran given and surgery aware, KUB ordered. Will continue to monitor.

## 2022-04-21 NOTE — PROGRESS NOTES
General Surgery  Interval Note:    Patient with worsening nausea followed by multiple episodes of bilious emesis throughout the night, totaling roughly 1.1L. Abdominal XR predictably displayed multiple distended loops of small bowel consistent with ileus. NG tube placement was offered to the patient, who was amenable. An 18Fr NG tube was placed via the R nare to approximately 55cm, whereafter an additional 500cc of bilious contents were immediately returned. Patient tolerated the procedure without issue. Sarah Wilson MD  PGY-2, General Surgery  04/21/22  4:25 AM  [phone]

## 2022-04-22 LAB
ALBUMIN SERPL-MCNC: 3.1 G/DL (ref 3.4–5)
ANION GAP SERPL CALCULATED.3IONS-SCNC: 10 MMOL/L (ref 3–16)
BASOPHILS ABSOLUTE: 0 K/UL (ref 0–0.2)
BASOPHILS RELATIVE PERCENT: 0.2 %
BUN BLDV-MCNC: 14 MG/DL (ref 7–20)
CALCIUM SERPL-MCNC: 9.2 MG/DL (ref 8.3–10.6)
CHLORIDE BLD-SCNC: 105 MMOL/L (ref 99–110)
CO2: 28 MMOL/L (ref 21–32)
CREAT SERPL-MCNC: 0.9 MG/DL (ref 0.6–1.1)
EOSINOPHILS ABSOLUTE: 0 K/UL (ref 0–0.6)
EOSINOPHILS RELATIVE PERCENT: 0.1 %
GFR AFRICAN AMERICAN: >60
GFR NON-AFRICAN AMERICAN: >60
GLUCOSE BLD-MCNC: 97 MG/DL (ref 70–99)
HCT VFR BLD CALC: 30.2 % (ref 36–48)
HEMOGLOBIN: 10.5 G/DL (ref 12–16)
LYMPHOCYTES ABSOLUTE: 1.4 K/UL (ref 1–5.1)
LYMPHOCYTES RELATIVE PERCENT: 17.8 %
MAGNESIUM: 2.1 MG/DL (ref 1.8–2.4)
MCH RBC QN AUTO: 29.6 PG (ref 26–34)
MCHC RBC AUTO-ENTMCNC: 34.6 G/DL (ref 31–36)
MCV RBC AUTO: 85.7 FL (ref 80–100)
MONOCYTES ABSOLUTE: 0.5 K/UL (ref 0–1.3)
MONOCYTES RELATIVE PERCENT: 6.4 %
NEUTROPHILS ABSOLUTE: 5.8 K/UL (ref 1.7–7.7)
NEUTROPHILS RELATIVE PERCENT: 75.5 %
PDW BLD-RTO: 13.7 % (ref 12.4–15.4)
PHOSPHORUS: 3.2 MG/DL (ref 2.5–4.9)
PLATELET # BLD: 292 K/UL (ref 135–450)
PMV BLD AUTO: 7.3 FL (ref 5–10.5)
POTASSIUM SERPL-SCNC: 3.8 MMOL/L (ref 3.5–5.1)
RBC # BLD: 3.53 M/UL (ref 4–5.2)
SODIUM BLD-SCNC: 143 MMOL/L (ref 136–145)
WBC # BLD: 7.7 K/UL (ref 4–11)

## 2022-04-22 PROCEDURE — A4216 STERILE WATER/SALINE, 10 ML: HCPCS | Performed by: STUDENT IN AN ORGANIZED HEALTH CARE EDUCATION/TRAINING PROGRAM

## 2022-04-22 PROCEDURE — 36415 COLL VENOUS BLD VENIPUNCTURE: CPT

## 2022-04-22 PROCEDURE — 2580000003 HC RX 258: Performed by: STUDENT IN AN ORGANIZED HEALTH CARE EDUCATION/TRAINING PROGRAM

## 2022-04-22 PROCEDURE — 83735 ASSAY OF MAGNESIUM: CPT

## 2022-04-22 PROCEDURE — 6360000002 HC RX W HCPCS: Performed by: STUDENT IN AN ORGANIZED HEALTH CARE EDUCATION/TRAINING PROGRAM

## 2022-04-22 PROCEDURE — 6370000000 HC RX 637 (ALT 250 FOR IP): Performed by: STUDENT IN AN ORGANIZED HEALTH CARE EDUCATION/TRAINING PROGRAM

## 2022-04-22 PROCEDURE — 80069 RENAL FUNCTION PANEL: CPT

## 2022-04-22 PROCEDURE — 85025 COMPLETE CBC W/AUTO DIFF WBC: CPT

## 2022-04-22 PROCEDURE — 1200000000 HC SEMI PRIVATE

## 2022-04-22 PROCEDURE — 2500000003 HC RX 250 WO HCPCS: Performed by: STUDENT IN AN ORGANIZED HEALTH CARE EDUCATION/TRAINING PROGRAM

## 2022-04-22 RX ORDER — POTASSIUM CHLORIDE 7.45 MG/ML
10 INJECTION INTRAVENOUS
Status: COMPLETED | OUTPATIENT
Start: 2022-04-22 | End: 2022-04-22

## 2022-04-22 RX ORDER — METOCLOPRAMIDE HYDROCHLORIDE 5 MG/ML
5 INJECTION INTRAMUSCULAR; INTRAVENOUS EVERY 6 HOURS
Status: DISCONTINUED | OUTPATIENT
Start: 2022-04-22 | End: 2022-04-24 | Stop reason: HOSPADM

## 2022-04-22 RX ADMIN — PIPERACILLIN AND TAZOBACTAM 3375 MG: 3; .375 INJECTION, POWDER, LYOPHILIZED, FOR SOLUTION INTRAVENOUS at 03:17

## 2022-04-22 RX ADMIN — Medication 10 MEQ: at 08:59

## 2022-04-22 RX ADMIN — METOCLOPRAMIDE HYDROCHLORIDE 5 MG: 5 INJECTION INTRAMUSCULAR; INTRAVENOUS at 05:52

## 2022-04-22 RX ADMIN — METOCLOPRAMIDE HYDROCHLORIDE 5 MG: 5 INJECTION INTRAMUSCULAR; INTRAVENOUS at 19:20

## 2022-04-22 RX ADMIN — PIPERACILLIN AND TAZOBACTAM 3375 MG: 3; .375 INJECTION, POWDER, LYOPHILIZED, FOR SOLUTION INTRAVENOUS at 11:42

## 2022-04-22 RX ADMIN — METOCLOPRAMIDE HYDROCHLORIDE 5 MG: 5 INJECTION INTRAMUSCULAR; INTRAVENOUS at 11:42

## 2022-04-22 RX ADMIN — Medication 1 SPRAY: at 08:59

## 2022-04-22 RX ADMIN — Medication 1 SPRAY: at 14:20

## 2022-04-22 RX ADMIN — FAMOTIDINE 20 MG: 10 INJECTION, SOLUTION INTRAVENOUS at 09:00

## 2022-04-22 RX ADMIN — SODIUM CHLORIDE, PRESERVATIVE FREE 10 ML: 5 INJECTION INTRAVENOUS at 21:17

## 2022-04-22 RX ADMIN — ENOXAPARIN SODIUM 40 MG: 100 INJECTION SUBCUTANEOUS at 09:00

## 2022-04-22 RX ADMIN — PIPERACILLIN AND TAZOBACTAM 3375 MG: 3; .375 INJECTION, POWDER, LYOPHILIZED, FOR SOLUTION INTRAVENOUS at 19:36

## 2022-04-22 RX ADMIN — FAMOTIDINE 20 MG: 10 INJECTION, SOLUTION INTRAVENOUS at 21:16

## 2022-04-22 RX ADMIN — METHOCARBAMOL 500 MG: 100 INJECTION, SOLUTION INTRAMUSCULAR; INTRAVENOUS at 00:37

## 2022-04-22 RX ADMIN — Medication 10 MEQ: at 09:54

## 2022-04-22 NOTE — PLAN OF CARE
Problem: Pain:  Goal: Pain level will decrease  Description: Pain level will decrease  Outcome: Progressing  Note: No pain qshift. Ambulating in hallways. VSS.

## 2022-04-22 NOTE — PROGRESS NOTES
Patient A&Ox4. VSS. NG remains to continuous suction. Patient ambulated in [place]. New IV placed. IV abx infused per orders. Patient reported that she passed a little gas. IV fluids infusing per orders. Bed is in lowest setting with bed alarm on. Call light is within reach.

## 2022-04-22 NOTE — PROGRESS NOTES
Surgery Daily Progress Note  Cameron Richardson  CC:  Ruptured appendix      Subjective :   No acute issues overnight. Patient afebrile with some mild intermittent tachycardia, but HDS. She states abdominal pain is controlled. Denies nausea/ vomiting. Taking in quite a bit of ice chips. Had a bowel movement this morning. Passing flatus. Objective    Infusions:   lactated ringers 125 mL/hr at 04/21/22 0647    sodium chloride 10 mL/hr at 04/21/22 1944        I/O:I/O last 3 completed shifts: In: 18 [P.O.:560; NG/GT:10]  Out: 3350 [Urine:600; Emesis/NG output:2750]           Wt Readings from Last 1 Encounters:   04/18/22 220 lb 7.4 oz (100 kg)                 LABS:    Recent Labs     04/21/22  0800 04/22/22  0537   WBC 11.8* 7.7   HGB 12.0 10.5*   HCT 34.3* 30.2*   MCV 84.6 85.7    292        Recent Labs     04/21/22  0800 04/22/22  0537    143   K 3.5 3.8    105   CO2 26 28   PHOS 3.7 3.2   BUN 13 14   CREATININE 0.9 0.9        No results for input(s): AST, ALT, ALB, BILIDIR, BILITOT, ALKPHOS in the last 72 hours. No results for input(s): LIPASE, AMYLASE in the last 72 hours. No results for input(s): PROT, INR, APTT in the last 72 hours. No results for input(s): CKTOTAL, CKMB, CKMBINDEX, TROPONINI in the last 72 hours.           Exam:BP (!) 159/93   Pulse 95   Temp 98.2 °F (36.8 °C) (Oral)   Resp 18   Ht 5' 4\" (1.626 m)   Wt 220 lb 7.4 oz (100 kg)   SpO2 93%   BMI 37.84 kg/m²   General appearance: alert, resting in bed, in NAD  Lungs: symmetrical chest rise, normal effort, no adventitious breath sounds,  Heart: regular rate and rhythm, good distal perfusion  Abdomen: soft, appropriately-tender; incisions c/d/i with dermabond in place, bowel sounds present, NG with bilious output  Extremities: no edema or cyanosis      ASSESSMENT/PLAN: Pt. is a 63 y.o. female s/p Laparoscopic ruptured appendectomy POD#4    - NG with clamp trial this morning, educated the patient on not taking in any ice chips while on clamp trial  - Continue Zosyn  - Continue IS and pulm toilet  - OOB and ambulate    Bradley Fermin DO  PGY1, General Surgery  04/22/22  6:51 AM  [phone] Medical Decision Making

## 2022-04-23 ENCOUNTER — APPOINTMENT (OUTPATIENT)
Dept: GENERAL RADIOLOGY | Age: 62
DRG: 853 | End: 2022-04-23
Payer: COMMERCIAL

## 2022-04-23 LAB
ALBUMIN SERPL-MCNC: 3 G/DL (ref 3.4–5)
ANION GAP SERPL CALCULATED.3IONS-SCNC: 14 MMOL/L (ref 3–16)
BASOPHILS ABSOLUTE: 0 K/UL (ref 0–0.2)
BASOPHILS RELATIVE PERCENT: 0.2 %
BUN BLDV-MCNC: 9 MG/DL (ref 7–20)
CALCIUM SERPL-MCNC: 9.1 MG/DL (ref 8.3–10.6)
CHLORIDE BLD-SCNC: 103 MMOL/L (ref 99–110)
CO2: 21 MMOL/L (ref 21–32)
CREAT SERPL-MCNC: 0.7 MG/DL (ref 0.6–1.1)
EOSINOPHILS ABSOLUTE: 0 K/UL (ref 0–0.6)
EOSINOPHILS RELATIVE PERCENT: 0.2 %
GFR AFRICAN AMERICAN: >60
GFR NON-AFRICAN AMERICAN: >60
GLUCOSE BLD-MCNC: 71 MG/DL (ref 70–99)
HCT VFR BLD CALC: 30.8 % (ref 36–48)
HEMOGLOBIN: 10.6 G/DL (ref 12–16)
LYMPHOCYTES ABSOLUTE: 1.6 K/UL (ref 1–5.1)
LYMPHOCYTES RELATIVE PERCENT: 19.6 %
MAGNESIUM: 2 MG/DL (ref 1.8–2.4)
MCH RBC QN AUTO: 29.5 PG (ref 26–34)
MCHC RBC AUTO-ENTMCNC: 34.5 G/DL (ref 31–36)
MCV RBC AUTO: 85.6 FL (ref 80–100)
MONOCYTES ABSOLUTE: 0.5 K/UL (ref 0–1.3)
MONOCYTES RELATIVE PERCENT: 5.7 %
NEUTROPHILS ABSOLUTE: 5.9 K/UL (ref 1.7–7.7)
NEUTROPHILS RELATIVE PERCENT: 74.3 %
PDW BLD-RTO: 13.8 % (ref 12.4–15.4)
PHOSPHORUS: 3.1 MG/DL (ref 2.5–4.9)
PLATELET # BLD: 317 K/UL (ref 135–450)
PMV BLD AUTO: 6.9 FL (ref 5–10.5)
POTASSIUM SERPL-SCNC: 3.8 MMOL/L (ref 3.5–5.1)
RBC # BLD: 3.6 M/UL (ref 4–5.2)
SODIUM BLD-SCNC: 138 MMOL/L (ref 136–145)
WBC # BLD: 7.9 K/UL (ref 4–11)

## 2022-04-23 PROCEDURE — 6370000000 HC RX 637 (ALT 250 FOR IP): Performed by: SURGERY

## 2022-04-23 PROCEDURE — 74018 RADEX ABDOMEN 1 VIEW: CPT

## 2022-04-23 PROCEDURE — 1200000000 HC SEMI PRIVATE

## 2022-04-23 PROCEDURE — 36415 COLL VENOUS BLD VENIPUNCTURE: CPT

## 2022-04-23 PROCEDURE — 80069 RENAL FUNCTION PANEL: CPT

## 2022-04-23 PROCEDURE — 2580000003 HC RX 258: Performed by: STUDENT IN AN ORGANIZED HEALTH CARE EDUCATION/TRAINING PROGRAM

## 2022-04-23 PROCEDURE — 6370000000 HC RX 637 (ALT 250 FOR IP): Performed by: STUDENT IN AN ORGANIZED HEALTH CARE EDUCATION/TRAINING PROGRAM

## 2022-04-23 PROCEDURE — 85025 COMPLETE CBC W/AUTO DIFF WBC: CPT

## 2022-04-23 PROCEDURE — 6360000002 HC RX W HCPCS: Performed by: STUDENT IN AN ORGANIZED HEALTH CARE EDUCATION/TRAINING PROGRAM

## 2022-04-23 PROCEDURE — 6360000002 HC RX W HCPCS

## 2022-04-23 PROCEDURE — A4216 STERILE WATER/SALINE, 10 ML: HCPCS | Performed by: STUDENT IN AN ORGANIZED HEALTH CARE EDUCATION/TRAINING PROGRAM

## 2022-04-23 PROCEDURE — 83735 ASSAY OF MAGNESIUM: CPT

## 2022-04-23 PROCEDURE — 2500000003 HC RX 250 WO HCPCS: Performed by: STUDENT IN AN ORGANIZED HEALTH CARE EDUCATION/TRAINING PROGRAM

## 2022-04-23 PROCEDURE — 99024 POSTOP FOLLOW-UP VISIT: CPT | Performed by: SURGERY

## 2022-04-23 PROCEDURE — 2580000003 HC RX 258

## 2022-04-23 RX ORDER — TRAZODONE HYDROCHLORIDE 50 MG/1
50 TABLET ORAL NIGHTLY
Status: DISCONTINUED | OUTPATIENT
Start: 2022-04-23 | End: 2022-04-24 | Stop reason: HOSPADM

## 2022-04-23 RX ORDER — LEVOTHYROXINE SODIUM 0.12 MG/1
125 TABLET ORAL DAILY
Status: DISCONTINUED | OUTPATIENT
Start: 2022-04-23 | End: 2022-04-24 | Stop reason: HOSPADM

## 2022-04-23 RX ORDER — VENLAFAXINE HYDROCHLORIDE 75 MG/1
75 CAPSULE, EXTENDED RELEASE ORAL DAILY
Status: DISCONTINUED | OUTPATIENT
Start: 2022-04-24 | End: 2022-04-24

## 2022-04-23 RX ORDER — TRAZODONE HYDROCHLORIDE 50 MG/1
50 TABLET ORAL DAILY
Status: DISCONTINUED | OUTPATIENT
Start: 2022-04-23 | End: 2022-04-23

## 2022-04-23 RX ORDER — POTASSIUM CHLORIDE 7.45 MG/ML
10 INJECTION INTRAVENOUS
Status: COMPLETED | OUTPATIENT
Start: 2022-04-23 | End: 2022-04-23

## 2022-04-23 RX ORDER — LABETALOL HYDROCHLORIDE 5 MG/ML
10 INJECTION, SOLUTION INTRAVENOUS EVERY 4 HOURS PRN
Status: DISCONTINUED | OUTPATIENT
Start: 2022-04-23 | End: 2022-04-24 | Stop reason: HOSPADM

## 2022-04-23 RX ORDER — VENLAFAXINE HYDROCHLORIDE 37.5 MG/1
37.5 CAPSULE, EXTENDED RELEASE ORAL DAILY
Status: DISCONTINUED | OUTPATIENT
Start: 2022-04-24 | End: 2022-04-24

## 2022-04-23 RX ORDER — LISINOPRIL 40 MG/1
40 TABLET ORAL DAILY
Status: DISCONTINUED | OUTPATIENT
Start: 2022-04-23 | End: 2022-04-24 | Stop reason: HOSPADM

## 2022-04-23 RX ORDER — ENALAPRILAT 2.5 MG/2ML
1.25 INJECTION INTRAVENOUS EVERY 6 HOURS PRN
Status: DISCONTINUED | OUTPATIENT
Start: 2022-04-23 | End: 2022-04-23

## 2022-04-23 RX ADMIN — METOCLOPRAMIDE HYDROCHLORIDE 5 MG: 5 INJECTION INTRAMUSCULAR; INTRAVENOUS at 06:02

## 2022-04-23 RX ADMIN — PIPERACILLIN AND TAZOBACTAM 3375 MG: 3; .375 INJECTION, POWDER, LYOPHILIZED, FOR SOLUTION INTRAVENOUS at 10:28

## 2022-04-23 RX ADMIN — PIPERACILLIN AND TAZOBACTAM 3375 MG: 3; .375 INJECTION, POWDER, LYOPHILIZED, FOR SOLUTION INTRAVENOUS at 03:52

## 2022-04-23 RX ADMIN — LISINOPRIL 40 MG: 40 TABLET ORAL at 13:12

## 2022-04-23 RX ADMIN — ENOXAPARIN SODIUM 40 MG: 100 INJECTION SUBCUTANEOUS at 07:43

## 2022-04-23 RX ADMIN — SODIUM CHLORIDE, PRESERVATIVE FREE 10 ML: 5 INJECTION INTRAVENOUS at 07:44

## 2022-04-23 RX ADMIN — METOCLOPRAMIDE HYDROCHLORIDE 5 MG: 5 INJECTION INTRAMUSCULAR; INTRAVENOUS at 10:29

## 2022-04-23 RX ADMIN — LEVOTHYROXINE SODIUM 125 MCG: 0.12 TABLET ORAL at 13:12

## 2022-04-23 RX ADMIN — FAMOTIDINE 20 MG: 10 INJECTION, SOLUTION INTRAVENOUS at 07:39

## 2022-04-23 RX ADMIN — METOCLOPRAMIDE HYDROCHLORIDE 5 MG: 5 INJECTION INTRAMUSCULAR; INTRAVENOUS at 17:17

## 2022-04-23 RX ADMIN — METOCLOPRAMIDE HYDROCHLORIDE 5 MG: 5 INJECTION INTRAMUSCULAR; INTRAVENOUS at 00:44

## 2022-04-23 RX ADMIN — POTASSIUM CHLORIDE 10 MEQ: 2 INJECTION, SOLUTION, CONCENTRATE INTRAVENOUS at 10:24

## 2022-04-23 RX ADMIN — TRAZODONE HYDROCHLORIDE 50 MG: 50 TABLET ORAL at 21:48

## 2022-04-23 RX ADMIN — PIPERACILLIN AND TAZOBACTAM 3375 MG: 3; .375 INJECTION, POWDER, LYOPHILIZED, FOR SOLUTION INTRAVENOUS at 19:07

## 2022-04-23 RX ADMIN — FAMOTIDINE 20 MG: 10 INJECTION, SOLUTION INTRAVENOUS at 21:48

## 2022-04-23 RX ADMIN — POTASSIUM CHLORIDE 10 MEQ: 2 INJECTION, SOLUTION, CONCENTRATE INTRAVENOUS at 09:36

## 2022-04-23 ASSESSMENT — PAIN SCALES - GENERAL: PAINLEVEL_OUTOF10: 2

## 2022-04-23 NOTE — PROGRESS NOTES
VSS A/Ox4 pt has been up in room and sitting up to chair. Pt is tolerating clear liquid diet without c/o nausea. Pt has no c/o pain at this time. Pt is voiding freely in bathroom. Fall precautions in place call light in reach bed in lowest position will continue to monitor.

## 2022-04-23 NOTE — PROGRESS NOTES
Patient A&Ox4. VSS on RA. Patient reports she is free of pain during the shift. POD5 lap ruptured appendectomy. NG put to continuous suction after clamp trial with 250 mL output overnight. Multiple BMs overnight. NG due to be removed this AM. 4 lap sites cdi. Independent after set up to restroom. Patient ambulated around halls. No significant events overnight. All care needs addressed with no further needs at this time. Bed is locked and in lowest position. Call light and bedside table within reach. Will continue to monitor per protocol.      Electronically signed by Stef Virk RN on 4/23/2022 at 6:48 AM

## 2022-04-23 NOTE — PROGRESS NOTES
Surgery Daily Progress Note  Brandt Pond  CC:  Ruptured appendix      Subjective :   Patient rested well overnight. Remains afebrile and HDS. Frustrated with NG tube. Having multiple bowel movements. Denies abdominal pain. Objective    Infusions:   lactated ringers 125 mL/hr at 04/21/22 0647    sodium chloride Stopped (04/21/22 1947)        I/O:I/O last 3 completed shifts: In: 4955.7 [P.O.:320; I.V.:4050.1; NG/GT:30; IV Piggyback:555.6]  Out: 2525 [Urine:975; Emesis/NG output:1550]           Wt Readings from Last 1 Encounters:   04/18/22 220 lb 7.4 oz (100 kg)                 LABS:    Recent Labs     04/21/22  0800 04/22/22  0537   WBC 11.8* 7.7   HGB 12.0 10.5*   HCT 34.3* 30.2*   MCV 84.6 85.7    292        Recent Labs     04/21/22  0800 04/22/22  0537    143   K 3.5 3.8    105   CO2 26 28   PHOS 3.7 3.2   BUN 13 14   CREATININE 0.9 0.9        No results for input(s): AST, ALT, ALB, BILIDIR, BILITOT, ALKPHOS in the last 72 hours. No results for input(s): LIPASE, AMYLASE in the last 72 hours. No results for input(s): PROT, INR, APTT in the last 72 hours. No results for input(s): CKTOTAL, CKMB, CKMBINDEX, TROPONINI in the last 72 hours.           Exam:BP (!) 142/90   Pulse 82   Temp 97.9 °F (36.6 °C) (Oral)   Resp 16   Ht 5' 4\" (1.626 m)   Wt 220 lb 7.4 oz (100 kg)   SpO2 96%   BMI 37.84 kg/m²   General appearance: alert, resting in bed, in NAD  Lungs: symmetrical chest rise, normal effort, no adventitious breath sounds,  Heart: regular rate and rhythm, good distal perfusion  Abdomen: soft, appropriately-tender; incisions c/d/i with dermabond in place, bowel sounds present, NG with gastric output  Extremities: no edema or cyanosis      ASSESSMENT/PLAN: Pt. is a 63 y.o. female s/p Laparoscopic ruptured appendectomy POD#5    - KUB this morning with some small bowel distension that is quite improved from 4/20  - Patient with multiple bowel movements, and significant decrease in NG output, NG tube removed at bedside this morning with patient education to take it slow with diet, and to replace tube with return of nausea or vomiting  - Continue Zosyn  - Continue IS and pulm toilet  - OOB and ambulate    Dennys Mcgee DO  PGY1, General Surgery  04/23/22  7:09 AM  [phone]    I have seen, examined, and reviewed the patients chart. I agree with the residents assessment and have made appropriate changes.     Ralf Chau

## 2022-04-24 ENCOUNTER — APPOINTMENT (OUTPATIENT)
Dept: GENERAL RADIOLOGY | Age: 62
DRG: 853 | End: 2022-04-24
Payer: COMMERCIAL

## 2022-04-24 VITALS
RESPIRATION RATE: 20 BRPM | BODY MASS INDEX: 37.64 KG/M2 | OXYGEN SATURATION: 94 % | SYSTOLIC BLOOD PRESSURE: 151 MMHG | HEART RATE: 95 BPM | HEIGHT: 64 IN | DIASTOLIC BLOOD PRESSURE: 90 MMHG | TEMPERATURE: 97.5 F | WEIGHT: 220.46 LBS

## 2022-04-24 LAB
ALBUMIN SERPL-MCNC: 3.2 G/DL (ref 3.4–5)
ANION GAP SERPL CALCULATED.3IONS-SCNC: 16 MMOL/L (ref 3–16)
BASOPHILS ABSOLUTE: 0 K/UL (ref 0–0.2)
BASOPHILS RELATIVE PERCENT: 0.1 %
BUN BLDV-MCNC: 5 MG/DL (ref 7–20)
CALCIUM SERPL-MCNC: 9.1 MG/DL (ref 8.3–10.6)
CHLORIDE BLD-SCNC: 106 MMOL/L (ref 99–110)
CO2: 21 MMOL/L (ref 21–32)
CREAT SERPL-MCNC: 0.7 MG/DL (ref 0.6–1.1)
EOSINOPHILS ABSOLUTE: 0 K/UL (ref 0–0.6)
EOSINOPHILS RELATIVE PERCENT: 0.2 %
GFR AFRICAN AMERICAN: >60
GFR NON-AFRICAN AMERICAN: >60
GLUCOSE BLD-MCNC: 109 MG/DL (ref 70–99)
HCT VFR BLD CALC: 30.1 % (ref 36–48)
HEMOGLOBIN: 10.2 G/DL (ref 12–16)
LYMPHOCYTES ABSOLUTE: 1.3 K/UL (ref 1–5.1)
LYMPHOCYTES RELATIVE PERCENT: 17.5 %
MAGNESIUM: 1.9 MG/DL (ref 1.8–2.4)
MCH RBC QN AUTO: 29.2 PG (ref 26–34)
MCHC RBC AUTO-ENTMCNC: 34 G/DL (ref 31–36)
MCV RBC AUTO: 85.8 FL (ref 80–100)
MONOCYTES ABSOLUTE: 0.5 K/UL (ref 0–1.3)
MONOCYTES RELATIVE PERCENT: 7.4 %
NEUTROPHILS ABSOLUTE: 5.4 K/UL (ref 1.7–7.7)
NEUTROPHILS RELATIVE PERCENT: 74.8 %
PDW BLD-RTO: 13.5 % (ref 12.4–15.4)
PHOSPHORUS: 3 MG/DL (ref 2.5–4.9)
PLATELET # BLD: 304 K/UL (ref 135–450)
PMV BLD AUTO: 6.9 FL (ref 5–10.5)
POTASSIUM SERPL-SCNC: 3.1 MMOL/L (ref 3.5–5.1)
POTASSIUM SERPL-SCNC: 3.8 MMOL/L (ref 3.5–5.1)
RBC # BLD: 3.5 M/UL (ref 4–5.2)
SODIUM BLD-SCNC: 143 MMOL/L (ref 136–145)
WBC # BLD: 7.2 K/UL (ref 4–11)

## 2022-04-24 PROCEDURE — 36415 COLL VENOUS BLD VENIPUNCTURE: CPT

## 2022-04-24 PROCEDURE — 74019 RADEX ABDOMEN 2 VIEWS: CPT

## 2022-04-24 PROCEDURE — 2580000003 HC RX 258: Performed by: STUDENT IN AN ORGANIZED HEALTH CARE EDUCATION/TRAINING PROGRAM

## 2022-04-24 PROCEDURE — 85025 COMPLETE CBC W/AUTO DIFF WBC: CPT

## 2022-04-24 PROCEDURE — 84132 ASSAY OF SERUM POTASSIUM: CPT

## 2022-04-24 PROCEDURE — 6360000002 HC RX W HCPCS

## 2022-04-24 PROCEDURE — A4216 STERILE WATER/SALINE, 10 ML: HCPCS | Performed by: STUDENT IN AN ORGANIZED HEALTH CARE EDUCATION/TRAINING PROGRAM

## 2022-04-24 PROCEDURE — 6370000000 HC RX 637 (ALT 250 FOR IP)

## 2022-04-24 PROCEDURE — 83735 ASSAY OF MAGNESIUM: CPT

## 2022-04-24 PROCEDURE — 99024 POSTOP FOLLOW-UP VISIT: CPT | Performed by: SURGERY

## 2022-04-24 PROCEDURE — 6360000002 HC RX W HCPCS: Performed by: STUDENT IN AN ORGANIZED HEALTH CARE EDUCATION/TRAINING PROGRAM

## 2022-04-24 PROCEDURE — 6370000000 HC RX 637 (ALT 250 FOR IP): Performed by: SURGERY

## 2022-04-24 PROCEDURE — 80069 RENAL FUNCTION PANEL: CPT

## 2022-04-24 PROCEDURE — 2500000003 HC RX 250 WO HCPCS: Performed by: STUDENT IN AN ORGANIZED HEALTH CARE EDUCATION/TRAINING PROGRAM

## 2022-04-24 RX ORDER — OXYCODONE HYDROCHLORIDE 5 MG/1
5 TABLET ORAL EVERY 6 HOURS PRN
Qty: 4 TABLET | Refills: 0 | Status: SHIPPED | OUTPATIENT
Start: 2022-04-24 | End: 2022-04-25

## 2022-04-24 RX ORDER — POTASSIUM CHLORIDE 20 MEQ/1
40 TABLET, EXTENDED RELEASE ORAL EVERY 4 HOURS
Status: COMPLETED | OUTPATIENT
Start: 2022-04-24 | End: 2022-04-24

## 2022-04-24 RX ORDER — DOCUSATE SODIUM 100 MG/1
100 CAPSULE, LIQUID FILLED ORAL 2 TIMES DAILY PRN
Qty: 2 CAPSULE | Refills: 0 | Status: SHIPPED | OUTPATIENT
Start: 2022-04-24 | End: 2022-04-27

## 2022-04-24 RX ORDER — VENLAFAXINE HYDROCHLORIDE 150 MG/1
150 CAPSULE, EXTENDED RELEASE ORAL
Status: DISCONTINUED | OUTPATIENT
Start: 2022-04-24 | End: 2022-04-24 | Stop reason: HOSPADM

## 2022-04-24 RX ORDER — VENLAFAXINE HYDROCHLORIDE 150 MG/1
150 TABLET, EXTENDED RELEASE ORAL
COMMUNITY

## 2022-04-24 RX ORDER — POTASSIUM CHLORIDE 20 MEQ/1
20 TABLET, EXTENDED RELEASE ORAL ONCE
Status: COMPLETED | OUTPATIENT
Start: 2022-04-24 | End: 2022-04-24

## 2022-04-24 RX ORDER — MAGNESIUM SULFATE IN WATER 40 MG/ML
2000 INJECTION, SOLUTION INTRAVENOUS ONCE
Status: COMPLETED | OUTPATIENT
Start: 2022-04-24 | End: 2022-04-24

## 2022-04-24 RX ADMIN — POTASSIUM CHLORIDE 40 MEQ: 1500 TABLET, EXTENDED RELEASE ORAL at 07:53

## 2022-04-24 RX ADMIN — FAMOTIDINE 20 MG: 10 INJECTION, SOLUTION INTRAVENOUS at 07:41

## 2022-04-24 RX ADMIN — SODIUM CHLORIDE, POTASSIUM CHLORIDE, SODIUM LACTATE AND CALCIUM CHLORIDE: 600; 310; 30; 20 INJECTION, SOLUTION INTRAVENOUS at 03:00

## 2022-04-24 RX ADMIN — MAGNESIUM SULFATE HEPTAHYDRATE 2000 MG: 2 INJECTION, SOLUTION INTRAVENOUS at 07:56

## 2022-04-24 RX ADMIN — METOCLOPRAMIDE HYDROCHLORIDE 5 MG: 5 INJECTION INTRAMUSCULAR; INTRAVENOUS at 06:17

## 2022-04-24 RX ADMIN — VENLAFAXINE HYDROCHLORIDE 150 MG: 150 CAPSULE, EXTENDED RELEASE ORAL at 07:40

## 2022-04-24 RX ADMIN — PIPERACILLIN AND TAZOBACTAM 3375 MG: 3; .375 INJECTION, POWDER, LYOPHILIZED, FOR SOLUTION INTRAVENOUS at 10:25

## 2022-04-24 RX ADMIN — ENOXAPARIN SODIUM 40 MG: 100 INJECTION SUBCUTANEOUS at 07:40

## 2022-04-24 RX ADMIN — METOCLOPRAMIDE HYDROCHLORIDE 5 MG: 5 INJECTION INTRAMUSCULAR; INTRAVENOUS at 10:24

## 2022-04-24 RX ADMIN — METOCLOPRAMIDE HYDROCHLORIDE 5 MG: 5 INJECTION INTRAMUSCULAR; INTRAVENOUS at 18:14

## 2022-04-24 RX ADMIN — LEVOTHYROXINE SODIUM 125 MCG: 0.12 TABLET ORAL at 07:40

## 2022-04-24 RX ADMIN — PIPERACILLIN AND TAZOBACTAM 3375 MG: 3; .375 INJECTION, POWDER, LYOPHILIZED, FOR SOLUTION INTRAVENOUS at 02:59

## 2022-04-24 RX ADMIN — POTASSIUM CHLORIDE 20 MEQ: 20 TABLET, EXTENDED RELEASE ORAL at 16:39

## 2022-04-24 RX ADMIN — SODIUM CHLORIDE, PRESERVATIVE FREE 10 ML: 5 INJECTION INTRAVENOUS at 07:57

## 2022-04-24 RX ADMIN — LISINOPRIL 40 MG: 40 TABLET ORAL at 07:41

## 2022-04-24 RX ADMIN — POTASSIUM CHLORIDE 40 MEQ: 1500 TABLET, EXTENDED RELEASE ORAL at 12:53

## 2022-04-24 NOTE — PROGRESS NOTES
VSS A/Ox4 pt is calm/cooperative. Pt is up ambulating hallway without any complaints. Pt is upgrade to regular diet per orders. Pt is voiding freely. Pt has had multiple loose BM throughout the shift. Pt has no c/o abd pain at this time. Call light in reach bed in lowest position will continue to monitor.

## 2022-04-24 NOTE — PROGRESS NOTES
Surgery Daily Progress Note      CC: Perforated appendicitis    SUBJECTIVE:  No acute events overnight. Remains afebrile, hemodynamically stable. Tolerating clear liquids without nausea/vomiting; voiding appropriately, having multiple BMs. Denies abdominal pain. ROS:   A 14 point review of systems was conducted, significant findings as noted above. All other systems negative. OBJECTIVE:    PHYSICAL EXAM:  Vitals:    04/23/22 1554 04/23/22 1940 04/23/22 2309 04/24/22 0255   BP: (!) 163/76 (!) 161/89 (!) 159/92 (!) 156/90   Pulse: 85 96 92 95   Resp: 18 18 18 18   Temp: 98.1 °F (36.7 °C) 97.9 °F (36.6 °C) 98.2 °F (36.8 °C) 98 °F (36.7 °C)   TempSrc: Oral Oral Oral Oral   SpO2: 95% 95% 95% 94%   Weight:       Height:           General appearance: Alert, no acute distress, well-developed, well-nourished  HEENT: Normocephalic, extraocular movements grossly intact, moist mucous membranes  Chest/Lungs: Normal inspiratory effort, symmetric chest rise, no accessory muscle use  Cardiovascular: Regular rate and rhythm  Abdomen: soft, appropriately-tender; incisions c/d/i with Dermabond in place, bowel sounds active  Neuro: A&Ox3, no gross motor or sensory neuro deficits  Extremities: no edema, no cyanosis      ASSESSMENT & PLAN:   Rosie Borrego is a 63 y.o. female with perforated appendicitis s/p laparoscopic appendectomy (4/18), POD #6.    - Continue clear liquid diet; follow up abdominal XR  - Potential advancement of diet later today  - Encourage OOB/ambulation  - Continue Roxanna Pat MD, PGY-2  04/24/22  6:58 AM  [phone]    I have seen, examined, and reviewed the patients chart. I agree with the residents assessment and have made appropriate changes.     Sabi Raines

## 2022-04-24 NOTE — PROGRESS NOTES
Day shift nurse reviewed discharge instructions and gave patient the perscription. IV removed without complication. Patient's belongings placed in bag. Patient took belongings and discharge instructions with her. Nurse took patient down to friend's car who was taking her home.

## 2022-04-24 NOTE — PROGRESS NOTES
Pt received discharge instruction. All questions addressed. IV removed without complication. Pt left with personal belongings.

## 2022-04-24 NOTE — PROGRESS NOTES
Patient A&Ox4. VSS. Surgical sites cdi. Patient is voiding freely. Patient had multiple BM. Patient is up independent in room and ambulating as tolerated. Patient tolerating diet with no complaints of nausea. IV fluids and abx infusing per orders. Bed is in lowest setting. Call light is within reach. All needs met at this time.

## 2022-05-12 ENCOUNTER — TELEMEDICINE (OUTPATIENT)
Dept: BARIATRICS/WEIGHT MGMT | Age: 62
End: 2022-05-12

## 2022-05-12 DIAGNOSIS — K35.32 RUPTURED APPENDICITIS: Primary | ICD-10-CM

## 2022-05-12 PROCEDURE — 99024 POSTOP FOLLOW-UP VISIT: CPT | Performed by: SURGERY

## 2022-05-30 NOTE — PROGRESS NOTES
Telephone F/U    Patient doing well  No N/V/F/C  Tolerating diet  Having BM's  Incisions healing well    2 weeks s/p appendectomy    No lifting more than 10 pounds for 6 weeks total    F/U PRN    Gaye Bloch

## 2022-06-27 NOTE — DISCHARGE SUMMARY
Physician Discharge Summary     Patient ID:  Vielka Hartmann  [id number]  89 y.o.  1960    Admit date: 4/18/2022    Discharge date and time: 4/24/2022  8:01 PM     Admitting Physician: Isidor Babinski, DO     Discharge Physician: Isidor Babinski, DO     Admission Diagnoses: Acute appendicitis, unspecified acute appendicitis type [K35.80]  Acute appendicitis with rupture [K35.32]    Discharge Diagnoses: Acute appendicitis, unspecified acute appendicitis type [K35.80]  Acute appendicitis with rupture [K35.32]    PMH:  has no past medical history on file. PSH:  has a past surgical history that includes Anterior cruciate ligament repair (Right); Hysterectomy, total abdominal; and laparoscopic appendectomy (N/A, 4/18/2022). Allergies: Patient has no known allergies. Admission Condition: fair    Discharged Condition: good    Indication for Admission: Acute appendicitis, unspecified acute appendicitis type [K35.80]  Acute appendicitis with rupture [facility] Course: This is a 63 y.o. F who presented to ED with epigastric and periumbulical pain that proceeded to migrate to RLQ. CT was consistent with acute appendicitis. Patient was admitted for laparoscopic appendectomy. Patient was made NPO, started on antibiotics and IVF. Patient underwent a laparoscopic appendectomy for ruptured appendicitis. Postoperatively, pain was well controlled. Patient was tolerating ice chips and Alba remained in place. IV zosyn was continued. POD1, no acute issues and pain was well-controlled. On POD2, patient was advanced to [facility], with plan to remain on antibiotics for 4 days post-op. On POD3, patient developed increased nausea and multiple episodes of bilious emesis. NG was placed with return of 500cc bilious contents. Patient was placed on low-continuous wall suction. Afterwards, patient was doing better in AM. On POD4, patient denued any nausea or vomiting and had a BM.  On POD5, patient continued to have bowel movements and NGT was removed. On POD6, no acute issues. Patient was tolerating CLD without nausea or vomiting. Patient discharged home in stable condition    Discharge Physical Exam:  Vitals:     04/23/22 1554 04/23/22 1940 04/23/22 2309 04/24/22 0255   BP: (!) 163/76 (!) 161/89 (!) 159/92 (!) 156/90   Pulse: 85 96 92 95   Resp: 18 18 18 18   Temp: 98.1 °F (36.7 °C) 97.9 °F (36.6 °C) 98.2 °F (36.8 °C) 98 °F (36.7 °C)   TempSrc: Oral Oral Oral Oral   SpO2: 95% 95% 95% 94%   Weight:           Height:                    General appearance: Alert, no acute distress, well-developed, well-nourished  HEENT: Normocephalic, extraocular movements grossly intact, moist mucous membranes  Chest/Lungs: Normal inspiratory effort, symmetric chest rise, no accessory muscle use  Cardiovascular: Regular rate and rhythm  Abdomen: soft, appropriately-tender; incisions c/d/i with Dermabond in place, bowel sounds active  Neuro: A&Ox3, no gross motor or sensory neuro deficits  Extremities: no edema, no cyanosis    Consults:   N/A    Significant Diagnostic Studies:   CT A/P 4/18:  Impression   1. Dilated appendix with periappendiceal inflammatory change representing acute, uncomplicated appendicitis. 2. Lobulated cystic structure of the pelvis which may emanate from the left ovary. Follow-up pelvic ultrasound is recommended. KUB 4/20:  Impression   Dilated loops of small bowel identified, concerning for a small    bowel obstruction. KUB 4/23:  Impression   Stable appearance of diffuse adynamic ileus with slight improvement     KUB 4/24:  Impression   1. Decreased small bowel distention compared to prior exam. No acute change otherwise. Treatments/Procedures: surgery: laparoscopic appendectomy    Disposition: home    Patient Instructions:   See discharge instruction form.     Signed:  Melania Dumas DO, PGY-1  6/26/2022  8:38 PM  [phone]

## (undated) DEVICE — BINDER ABD UNIV H9IN WAIST 45-62IN E SFT COT PREM 3 PNL

## (undated) DEVICE — TROCAR: Brand: KII FIOS FIRST ENTRY

## (undated) DEVICE — STAPLER INT L34CM 60MM LNG ENDOSCP ARTC PWR + ECHELON FLX

## (undated) DEVICE — SOLUTION INJ LR VISIV 1000ML BG

## (undated) DEVICE — RELOAD STPL L60MM H1-2.6MM MESENTERY THN TISS WHT 6 ROW

## (undated) DEVICE — Z DISCONTINUED USE 2272114 DRAPE SURG UTIL 26X15 IN W/ TAPE N INVASIVE MULTLYR DISP

## (undated) DEVICE — GLOVE ORANGE PI 7   MSG9070

## (undated) DEVICE — 40583 XL ADVANCED TRENDELENBURG POSITIONING KIT: Brand: 40583 XL ADVANCED TRENDELENBURG POSITIONING KIT

## (undated) DEVICE — TOWEL,STOP FLAG GOLD N-W: Brand: MEDLINE

## (undated) DEVICE — SUTURE VCRL SZ 4-0 L18IN ABSRB UD L19MM PS-2 3/8 CIR PRIM J496H

## (undated) DEVICE — SUTURE VCRL SZ 0 L54IN ABSRB VLT W/O NDL POLYGLACTIN 910 J616H

## (undated) DEVICE — TISSUE RETRIEVAL SYSTEM: Brand: INZII RETRIEVAL SYSTEM

## (undated) DEVICE — PUMP SUC IRR TBNG L10FT W/ HNDPC ASSEMB STRYKEFLOW 2

## (undated) DEVICE — TOTAL TRAY, 16FR 10ML SIL FOLEY, URN: Brand: MEDLINE

## (undated) DEVICE — GLOVE SURG SZ 75 L12IN THK75MIL DK GRN LTX FREE

## (undated) DEVICE — SOLUTION IV 1000ML 0.9% SOD CHL

## (undated) DEVICE — NEEDLE HYPO 22GA L1.5IN BLK POLYPR HUB S STL REG BVL STR

## (undated) DEVICE — ANTI-FOG SOLUTION WITH FOAM PAD: Brand: DEVON

## (undated) DEVICE — Device

## (undated) DEVICE — NEEDLE INSUF L120MM DIA2MM DISP FOR PNEUMOPERI ENDOPATH

## (undated) DEVICE — Z INACTIVE USE 2735373 APPLICATOR FBR LAIN COT WOOD TIP ECONOMICAL

## (undated) DEVICE — GENERAL LAPAROSCOPIC: Brand: MEDLINE INDUSTRIES, INC.

## (undated) DEVICE — APPLICATOR MEDICATED 26 CC SOLUTION HI LT ORNG CHLORAPREP